# Patient Record
Sex: MALE | Race: WHITE | Employment: FULL TIME | URBAN - METROPOLITAN AREA
[De-identification: names, ages, dates, MRNs, and addresses within clinical notes are randomized per-mention and may not be internally consistent; named-entity substitution may affect disease eponyms.]

---

## 2020-01-02 DIAGNOSIS — N39.0 URINARY TRACT INFECTION: Primary | ICD-10-CM

## 2020-02-06 ENCOUNTER — APPOINTMENT (OUTPATIENT)
Dept: MRI IMAGING | Facility: CLINIC | Age: 55
DRG: 300 | End: 2020-02-06
Attending: NURSE PRACTITIONER
Payer: COMMERCIAL

## 2020-02-06 ENCOUNTER — APPOINTMENT (OUTPATIENT)
Dept: MRI IMAGING | Facility: CLINIC | Age: 55
DRG: 300 | End: 2020-02-06
Attending: INTERNAL MEDICINE
Payer: COMMERCIAL

## 2020-02-06 ENCOUNTER — APPOINTMENT (OUTPATIENT)
Dept: CT IMAGING | Facility: CLINIC | Age: 55
DRG: 300 | End: 2020-02-06
Attending: EMERGENCY MEDICINE
Payer: COMMERCIAL

## 2020-02-06 ENCOUNTER — HOSPITAL ENCOUNTER (INPATIENT)
Facility: CLINIC | Age: 55
LOS: 1 days | Discharge: HOME | DRG: 300 | End: 2020-02-07
Attending: EMERGENCY MEDICINE | Admitting: INTERNAL MEDICINE
Payer: COMMERCIAL

## 2020-02-06 ENCOUNTER — APPOINTMENT (OUTPATIENT)
Dept: CARDIOLOGY | Facility: CLINIC | Age: 55
DRG: 300 | End: 2020-02-06
Attending: INTERNAL MEDICINE
Payer: COMMERCIAL

## 2020-02-06 DIAGNOSIS — R29.898 WEAKNESS OF BOTH UPPER EXTREMITIES: ICD-10-CM

## 2020-02-06 DIAGNOSIS — I71.02 DISSECTION OF ABDOMINAL AORTA (H): ICD-10-CM

## 2020-02-06 DIAGNOSIS — I71.02 DISSECTION OF ABDOMINAL AORTA (H): Primary | ICD-10-CM

## 2020-02-06 PROBLEM — I71.00 AORTIC DISSECTION (H): Status: ACTIVE | Noted: 2020-02-06

## 2020-02-06 PROBLEM — M45.7 ANKYLOSING SPONDYLITIS OF LUMBOSACRAL REGION (H): Status: ACTIVE | Noted: 2020-02-06

## 2020-02-06 LAB
ANION GAP SERPL CALCULATED.3IONS-SCNC: 5 MMOL/L (ref 3–14)
APTT PPP: 31 SEC (ref 22–37)
BASOPHILS # BLD AUTO: 0 10E9/L (ref 0–0.2)
BASOPHILS NFR BLD AUTO: 0.3 %
BUN SERPL-MCNC: 13 MG/DL (ref 7–30)
CALCIUM SERPL-MCNC: 9.4 MG/DL (ref 8.5–10.1)
CHLORIDE SERPL-SCNC: 103 MMOL/L (ref 94–109)
CO2 SERPL-SCNC: 29 MMOL/L (ref 20–32)
CREAT SERPL-MCNC: 0.92 MG/DL (ref 0.66–1.25)
DIFFERENTIAL METHOD BLD: ABNORMAL
EOSINOPHIL # BLD AUTO: 0.1 10E9/L (ref 0–0.7)
EOSINOPHIL NFR BLD AUTO: 0.7 %
ERYTHROCYTE [DISTWIDTH] IN BLOOD BY AUTOMATED COUNT: 12.1 % (ref 10–15)
GFR SERPL CREATININE-BSD FRML MDRD: >90 ML/MIN/{1.73_M2}
GLUCOSE BLDC GLUCOMTR-MCNC: 95 MG/DL (ref 70–99)
GLUCOSE SERPL-MCNC: 141 MG/DL (ref 70–99)
HCT VFR BLD AUTO: 43.9 % (ref 40–53)
HGB BLD-MCNC: 14.9 G/DL (ref 13.3–17.7)
IMM GRANULOCYTES # BLD: 0 10E9/L (ref 0–0.4)
IMM GRANULOCYTES NFR BLD: 0.2 %
INR PPP: 1.07 (ref 0.86–1.14)
LYMPHOCYTES # BLD AUTO: 1.9 10E9/L (ref 0.8–5.3)
LYMPHOCYTES NFR BLD AUTO: 15.6 %
MCH RBC QN AUTO: 31.8 PG (ref 26.5–33)
MCHC RBC AUTO-ENTMCNC: 33.9 G/DL (ref 31.5–36.5)
MCV RBC AUTO: 94 FL (ref 78–100)
MONOCYTES # BLD AUTO: 1 10E9/L (ref 0–1.3)
MONOCYTES NFR BLD AUTO: 8.5 %
NEUTROPHILS # BLD AUTO: 8.9 10E9/L (ref 1.6–8.3)
NEUTROPHILS NFR BLD AUTO: 74.7 %
NRBC # BLD AUTO: 0 10*3/UL
NRBC BLD AUTO-RTO: 0 /100
PLATELET # BLD AUTO: 312 10E9/L (ref 150–450)
POTASSIUM SERPL-SCNC: 3.9 MMOL/L (ref 3.4–5.3)
RBC # BLD AUTO: 4.68 10E12/L (ref 4.4–5.9)
SODIUM SERPL-SCNC: 137 MMOL/L (ref 133–144)
WBC # BLD AUTO: 11.9 10E9/L (ref 4–11)

## 2020-02-06 PROCEDURE — 99207 ZZC APP CREDIT; MD BILLING SHARED VISIT: CPT | Performed by: NURSE PRACTITIONER

## 2020-02-06 PROCEDURE — 99223 1ST HOSP IP/OBS HIGH 75: CPT | Performed by: NURSE PRACTITIONER

## 2020-02-06 PROCEDURE — 70551 MRI BRAIN STEM W/O DYE: CPT | Mod: 52

## 2020-02-06 PROCEDURE — 20000003 ZZH R&B ICU

## 2020-02-06 PROCEDURE — 93005 ELECTROCARDIOGRAM TRACING: CPT

## 2020-02-06 PROCEDURE — 25000125 ZZHC RX 250: Performed by: EMERGENCY MEDICINE

## 2020-02-06 PROCEDURE — 25800030 ZZH RX IP 258 OP 636: Performed by: INTERNAL MEDICINE

## 2020-02-06 PROCEDURE — 99291 CRITICAL CARE FIRST HOUR: CPT

## 2020-02-06 PROCEDURE — 71260 CT THORAX DX C+: CPT

## 2020-02-06 PROCEDURE — 85025 COMPLETE CBC W/AUTO DIFF WBC: CPT | Performed by: EMERGENCY MEDICINE

## 2020-02-06 PROCEDURE — 93306 TTE W/DOPPLER COMPLETE: CPT | Mod: 26 | Performed by: INTERNAL MEDICINE

## 2020-02-06 PROCEDURE — 40000264 ECHOCARDIOGRAM COMPLETE

## 2020-02-06 PROCEDURE — 72156 MRI NECK SPINE W/O & W/DYE: CPT

## 2020-02-06 PROCEDURE — 93010 ELECTROCARDIOGRAM REPORT: CPT | Performed by: INTERNAL MEDICINE

## 2020-02-06 PROCEDURE — 70450 CT HEAD/BRAIN W/O DYE: CPT

## 2020-02-06 PROCEDURE — 80048 BASIC METABOLIC PNL TOTAL CA: CPT | Performed by: EMERGENCY MEDICINE

## 2020-02-06 PROCEDURE — 85610 PROTHROMBIN TIME: CPT | Performed by: EMERGENCY MEDICINE

## 2020-02-06 PROCEDURE — 85730 THROMBOPLASTIN TIME PARTIAL: CPT | Performed by: EMERGENCY MEDICINE

## 2020-02-06 PROCEDURE — 70496 CT ANGIOGRAPHY HEAD: CPT

## 2020-02-06 PROCEDURE — 25000132 ZZH RX MED GY IP 250 OP 250 PS 637: Performed by: INTERNAL MEDICINE

## 2020-02-06 PROCEDURE — 00000146 ZZHCL STATISTIC GLUCOSE BY METER IP

## 2020-02-06 PROCEDURE — 96374 THER/PROPH/DIAG INJ IV PUSH: CPT | Mod: 59

## 2020-02-06 PROCEDURE — 72158 MRI LUMBAR SPINE W/O & W/DYE: CPT

## 2020-02-06 PROCEDURE — 25000128 H RX IP 250 OP 636: Performed by: EMERGENCY MEDICINE

## 2020-02-06 PROCEDURE — 25000128 H RX IP 250 OP 636: Performed by: HOSPITALIST

## 2020-02-06 PROCEDURE — 99292 CRITICAL CARE ADDL 30 MIN: CPT

## 2020-02-06 PROCEDURE — 99223 1ST HOSP IP/OBS HIGH 75: CPT | Mod: AI | Performed by: INTERNAL MEDICINE

## 2020-02-06 PROCEDURE — 72157 MRI CHEST SPINE W/O & W/DYE: CPT

## 2020-02-06 RX ORDER — POTASSIUM CHLORIDE 1.5 G/1.58G
20-40 POWDER, FOR SOLUTION ORAL
Status: DISCONTINUED | OUTPATIENT
Start: 2020-02-06 | End: 2020-02-07 | Stop reason: HOSPADM

## 2020-02-06 RX ORDER — GADOBUTROL 604.72 MG/ML
7 INJECTION INTRAVENOUS ONCE
Status: DISCONTINUED | OUTPATIENT
Start: 2020-02-06 | End: 2020-02-06

## 2020-02-06 RX ORDER — ACETAMINOPHEN 325 MG/1
650 TABLET ORAL EVERY 4 HOURS PRN
Status: DISCONTINUED | OUTPATIENT
Start: 2020-02-06 | End: 2020-02-07 | Stop reason: HOSPADM

## 2020-02-06 RX ORDER — GADOBUTROL 604.72 MG/ML
7 INJECTION INTRAVENOUS ONCE
Status: DISCONTINUED | OUTPATIENT
Start: 2020-02-06 | End: 2020-02-06 | Stop reason: CLARIF

## 2020-02-06 RX ORDER — HYDROMORPHONE HYDROCHLORIDE 1 MG/ML
.3-.5 INJECTION, SOLUTION INTRAMUSCULAR; INTRAVENOUS; SUBCUTANEOUS
Status: DISCONTINUED | OUTPATIENT
Start: 2020-02-06 | End: 2020-02-07 | Stop reason: HOSPADM

## 2020-02-06 RX ORDER — MAGNESIUM SULFATE HEPTAHYDRATE 40 MG/ML
4 INJECTION, SOLUTION INTRAVENOUS EVERY 4 HOURS PRN
Status: DISCONTINUED | OUTPATIENT
Start: 2020-02-06 | End: 2020-02-07 | Stop reason: HOSPADM

## 2020-02-06 RX ORDER — IOPAMIDOL 755 MG/ML
70 INJECTION, SOLUTION INTRAVASCULAR ONCE
Status: COMPLETED | OUTPATIENT
Start: 2020-02-06 | End: 2020-02-06

## 2020-02-06 RX ORDER — POTASSIUM CHLORIDE 1500 MG/1
20-40 TABLET, EXTENDED RELEASE ORAL
Status: DISCONTINUED | OUTPATIENT
Start: 2020-02-06 | End: 2020-02-07 | Stop reason: HOSPADM

## 2020-02-06 RX ORDER — POTASSIUM CHLORIDE 7.45 MG/ML
10 INJECTION INTRAVENOUS
Status: DISCONTINUED | OUTPATIENT
Start: 2020-02-06 | End: 2020-02-07 | Stop reason: HOSPADM

## 2020-02-06 RX ORDER — AMOXICILLIN 250 MG
1 CAPSULE ORAL 2 TIMES DAILY PRN
Status: DISCONTINUED | OUTPATIENT
Start: 2020-02-06 | End: 2020-02-07 | Stop reason: HOSPADM

## 2020-02-06 RX ORDER — DIAZEPAM 10 MG/2ML
2.5 INJECTION, SOLUTION INTRAMUSCULAR; INTRAVENOUS ONCE
Status: COMPLETED | OUTPATIENT
Start: 2020-02-06 | End: 2020-02-06

## 2020-02-06 RX ORDER — MELOXICAM 7.5 MG/1
7.5 TABLET ORAL DAILY
Status: ON HOLD | COMMUNITY
End: 2020-02-07

## 2020-02-06 RX ORDER — IOPAMIDOL 755 MG/ML
60 INJECTION, SOLUTION INTRAVASCULAR ONCE
Status: DISCONTINUED | OUTPATIENT
Start: 2020-02-06 | End: 2020-02-06

## 2020-02-06 RX ORDER — ONDANSETRON 4 MG/1
4 TABLET, ORALLY DISINTEGRATING ORAL EVERY 6 HOURS PRN
Status: DISCONTINUED | OUTPATIENT
Start: 2020-02-06 | End: 2020-02-07 | Stop reason: HOSPADM

## 2020-02-06 RX ORDER — POTASSIUM CL/LIDO/0.9 % NACL 10MEQ/0.1L
10 INTRAVENOUS SOLUTION, PIGGYBACK (ML) INTRAVENOUS
Status: DISCONTINUED | OUTPATIENT
Start: 2020-02-06 | End: 2020-02-07 | Stop reason: HOSPADM

## 2020-02-06 RX ORDER — NALOXONE HYDROCHLORIDE 0.4 MG/ML
.1-.4 INJECTION, SOLUTION INTRAMUSCULAR; INTRAVENOUS; SUBCUTANEOUS
Status: DISCONTINUED | OUTPATIENT
Start: 2020-02-06 | End: 2020-02-07 | Stop reason: HOSPADM

## 2020-02-06 RX ORDER — IOPAMIDOL 755 MG/ML
120 INJECTION, SOLUTION INTRAVASCULAR ONCE
Status: DISCONTINUED | OUTPATIENT
Start: 2020-02-06 | End: 2020-02-06

## 2020-02-06 RX ORDER — ONDANSETRON 2 MG/ML
4 INJECTION INTRAMUSCULAR; INTRAVENOUS EVERY 6 HOURS PRN
Status: DISCONTINUED | OUTPATIENT
Start: 2020-02-06 | End: 2020-02-07 | Stop reason: HOSPADM

## 2020-02-06 RX ORDER — HYDRALAZINE HYDROCHLORIDE 20 MG/ML
10 INJECTION INTRAMUSCULAR; INTRAVENOUS EVERY 4 HOURS PRN
Status: DISCONTINUED | OUTPATIENT
Start: 2020-02-06 | End: 2020-02-07 | Stop reason: HOSPADM

## 2020-02-06 RX ORDER — AMOXICILLIN 250 MG
2 CAPSULE ORAL 2 TIMES DAILY PRN
Status: DISCONTINUED | OUTPATIENT
Start: 2020-02-06 | End: 2020-02-07 | Stop reason: HOSPADM

## 2020-02-06 RX ORDER — POTASSIUM CHLORIDE 29.8 MG/ML
20 INJECTION INTRAVENOUS
Status: DISCONTINUED | OUTPATIENT
Start: 2020-02-06 | End: 2020-02-07 | Stop reason: HOSPADM

## 2020-02-06 RX ORDER — SODIUM CHLORIDE 9 MG/ML
INJECTION, SOLUTION INTRAVENOUS CONTINUOUS
Status: DISCONTINUED | OUTPATIENT
Start: 2020-02-06 | End: 2020-02-07 | Stop reason: HOSPADM

## 2020-02-06 RX ADMIN — LABETALOL HYDROCHLORIDE 1 MG/MIN: 5 INJECTION INTRAVENOUS at 10:19

## 2020-02-06 RX ADMIN — SODIUM CHLORIDE: 9 INJECTION, SOLUTION INTRAVENOUS at 13:18

## 2020-02-06 RX ADMIN — SODIUM CHLORIDE 100 ML: 9 INJECTION, SOLUTION INTRAVENOUS at 09:26

## 2020-02-06 RX ADMIN — DIAZEPAM 2.5 MG: 5 INJECTION, SOLUTION INTRAMUSCULAR; INTRAVENOUS at 22:54

## 2020-02-06 RX ADMIN — IOPAMIDOL 70 ML: 755 INJECTION, SOLUTION INTRAVENOUS at 09:26

## 2020-02-06 RX ADMIN — SODIUM CHLORIDE 100 ML: 9 INJECTION, SOLUTION INTRAVENOUS at 09:38

## 2020-02-06 RX ADMIN — ACETAMINOPHEN 650 MG: 325 TABLET, FILM COATED ORAL at 17:01

## 2020-02-06 RX ADMIN — IOPAMIDOL 70 ML: 755 INJECTION, SOLUTION INTRAVENOUS at 09:38

## 2020-02-06 ASSESSMENT — MIFFLIN-ST. JEOR: SCORE: 1684.75

## 2020-02-06 ASSESSMENT — ACTIVITIES OF DAILY LIVING (ADL)
SWALLOWING: 0-->SWALLOWS FOODS/LIQUIDS WITHOUT DIFFICULTY
AMBULATION: 0-->INDEPENDENT
BATHING: 0-->INDEPENDENT
TRANSFERRING: 0-->INDEPENDENT
TOILETING: 0-->INDEPENDENT
ADLS_ACUITY_SCORE: 11
RETIRED_COMMUNICATION: 0-->UNDERSTANDS/COMMUNICATES WITHOUT DIFFICULTY
COGNITION: 0 - NO COGNITION ISSUES REPORTED
FALL_HISTORY_WITHIN_LAST_SIX_MONTHS: NO
RETIRED_EATING: 0-->INDEPENDENT
DRESS: 0-->INDEPENDENT
ADLS_ACUITY_SCORE: 11

## 2020-02-06 ASSESSMENT — ENCOUNTER SYMPTOMS
NUMBNESS: 1
DIZZINESS: 1
BACK PAIN: 1

## 2020-02-06 NOTE — H&P
[facility]    History and Physical  Hospitalist       Date of Admission:  2/6/2020    Assessment & Plan   Nadeem Shine is a 54 year old male history of ankylosing spondylitis presents to the emergency department with acute onset of bilateral upper extremity weakness and numbness.  Active Problems:    Dissection of abdominal aorta (H)-infrarenal area  --Evaluation for his upper extremity weakness  with the CTA did indicate infrarenal aortic dissection, prior to the bifurcation of the both iliac arteries, currently started on  labetalol drip for blood pressure control, goal is systolic blood pressure is 120, patient did not have any prior history of hypertension, and the ER physician did have a concern whether patient has Marfan syndrome, he is 6.2 feet tall.  Echocardiogram ordered.  --Admit to ICU, continue neurochecks every 1 hour, continuous blood pressure monitoring.    Weakness of both upper extremities  --Patient presented with the bilateral upper extremity weakness, which has progressively improved since today morning, his weakness is 4/5 now and the numbness is improved, this could be either a TIA versus a spinal process, will need MRI of his cervical thoracic and lumbar spine.   MRI is ordered.  --Neurology had seen the patient in the ER, stroke neurology is involved, MRI of his brain and CTA of his head and neck did not indicate any signs of stroke.      Ankylosing spondylitis of lumbosacral region (H) history of  --Currently is only on PRN NSAID, he is HLA-B27 positive  DVT Prophylaxis: Pneumatic Compression Devices  Code Status: Full Code    Disposition: Expected discharge in 2 to 3 days    Eufemia Beyer MD    Primary Care Physician   Physician No Ref-Primary    Chief Complaint   Bilateral upper extremity numbness and weakness 1 day    History of Present Illness   Nadeem Shine is a 54 year old male history of ankylosing spondylitis presents with a bilateral upper extremity weakness.   Patient woke up at 6 AM and started noticing back pain mostly in the upper back and had numbness and tingling in his bilateral upper extremities, currently his symptoms are much improved, he had trouble raising his left arm above his head and he could not even grab a glass of water.  He took NSAID for pain control with no improvement he decided to report to the emergency department, he had also noticed unsteadiness, needed support to ambulate.    In the emergency department he was evaluated for a CVA, MRI of the brain, CTA head and neck was obtained and and CT angiogram was also done for aorta  which showed infrarenal aortic dissection.  Patient did not have any chest pain, shortness of breath, nausea vomiting or abdominal pain prior to this, denies any dysuria or increased frequency of micturition.  didNot have any fever or chills.    Past Medical History    I have reviewed this patient's medical history and updated it with pertinent information if needed.   Past Medical History:   Diagnosis Date     Arthritis        Past Surgical History   I reviewed his past surgical history, he does not have any history of prior surgeries.  Prior to Admission Medications   Prior to Admission Medications   Prescriptions Last Dose Informant Patient Reported? Taking?   UNABLE TO FIND 2/6/2020 at Unknown time Self Yes Yes   Sig: Take by mouth once MEDICATION NAME: paracetamol (similar to acetaminophen, used in other countries)   UNKNOWN TO PATIENT 2/6/2020 at Unknown time Self Yes Yes   Sig: Apply topically once Topical analgesic, unknown name   meloxicam (MOBIC) 7.5 MG tablet 2/6/2020 at am Self Yes Yes   Sig: Take 7.5 mg by mouth daily      Facility-Administered Medications: None     Allergies   No Known Allergies    Social History   I have reviewed this patient's social history and updated it with pertinent information if needed. Nadeem Shine  reports that he has never smoked. He does not have any smokeless tobacco history on  file. He reports current alcohol use. He reports that he does not use drugs.    Family History   I have reviewed this patient's family history and updated it with pertinent information if needed.   Mother has ankylosing spondylitis and HLA-B27   Review of Systems   The 10 point Review of Systems is negative other than noted in the HPI or here.    Physical Exam   Temp: 97.7  F (36.5  C) Temp src: Oral BP: 122/80 Pulse: 60 Heart Rate: 59 Resp: 11 SpO2: 98 % O2 Device: None (Room air)    Vital Signs with Ranges  Temp:  [97.7  F (36.5  C)] 97.7  F (36.5  C)  Pulse:  [55-80] 60  Heart Rate:  [59-66] 59  Resp:  [8-20] 11  BP: (114-155)/(78-94) 122/80  SpO2:  [98 %] 98 %  169 lbs 12.07 oz    Constitutional: Awake, alert, cooperative, no apparent distress.  Eyes: Conjunctiva and pupils examined and normal.  HEENT: Moist mucous membranes, normal dentition.  Respiratory: Clear to auscultation bilaterally, no crackles or wheezing.  Cardiovascular: Regular rate and rhythm, normal S1 and S2, and no murmur noted.  GI: Soft, non-distended, non-tender, normal bowel sounds.  Lymph/Hematologic: No anterior cervical or supraclavicular adenopathy.  Skin: No rashes, no cyanosis, no edema.  Musculoskeletal: No joint swelling, erythema or tenderness.  Neurologic: Cranial nerves 2-12 intact, bilateral upper extremity power seems to be 4 out of 5, similar on the lower extremities.  Psychiatric: Alert, oriented to person, place and time, no obvious anxiety or depression.    Data   Data reviewed today: ekg ordered  Recent Labs   Lab 02/06/20  0900   WBC 11.9*   HGB 14.9   MCV 94      INR 1.07      POTASSIUM 3.9   CHLORIDE 103   CO2 29   BUN 13   CR 0.92   ANIONGAP 5   JULIO 9.4   *       Imaging:  Recent Results (from the past 24 hour(s))   CTA Head Neck with Contrast    Narrative    CTA  HEAD AND NECK WITH CONTRAST 2/6/2020 9:30 AM     HISTORY: Transient ischemic attack, initial exam. Code Stroke.  Bilateral arm  weakness.    TECHNIQUE: Axial images were obtained through the head and neck with  intravenous contrast. 70 mL of Isovue-370 given. Radiation dose for  this scan was reduced using automated exposure control, adjustment of  the mA and/or kV according to patient size, or iterative  reconstruction technique. Multiplanar reconstructions were performed.  3-D reconstructions off a remote workstation for CT angiography were  also acquired. Carotid stenoses were evaluated by comparing the  caliber of the proximal internal carotid artery to the caliber of the  distal internal carotid artery.    FINDINGS:    Brachiocephalic vessels: Normal.    Right carotid system: Normal.    Left carotid system: Normal.    Right vertebral artery: Normal.    Left vertebral artery: Normal.    [place]: Normal.    Other findings: None.      Impression    IMPRESSION: Negative CT angiography of the head and neck.    DOM SOFIA MD   CT Head w/o Contrast    Narrative    CT SCAN OF THE HEAD WITHOUT CONTRAST   2/6/2020 9:39 AM     HISTORY: Transient ischemic attack, initial exam. Code Stroke.  Bilateral arm weakness.    TECHNIQUE:  Axial images of the head and coronal reformations without  IV contrast material.  Radiation dose for this scan was reduced using  automated exposure control, adjustment of the mA and/or kV according  to patient size, or iterative reconstruction technique.    COMPARISON: None.    FINDINGS:  The ventricles are normal in size, shape and configuration.   The brain parenchyma and subarachnoid spaces are normal. There is no  evidence of intracranial hemorrhage, mass, acute infarct or anomaly.     The visualized portions of the sinuses and mastoids appear normal.  There is no evidence of trauma.      Impression    IMPRESSION:  Normal CT scan of the head.     DOM SOFIA MD   CT Aortic Survey w Contrast    Narrative    CT AORTIC SURVEY WITH CONTRAST   2/6/2020 9:56 AM     HISTORY: Bilateral arm weakness. Bilateral flank  pain.    TECHNIQUE: 70 mL Isovue-370 IV were administered. Aortic survey  protocol was performed. After contrast administration, volumetric  helical sections were acquired from the thoracic inlet to the ischial  tuberosities. Coronal images were also reconstructed. Radiation dose  for this scan was reduced using automated exposure control, adjustment  of the mA and/or kV according to patient size, or iterative  reconstruction technique.    COMPARISON: None.    FINDINGS:    Chest: No evidence for thoracic aortic aneurysm or dissection. No  pleural or pericardial effusions. No enlarged lymph nodes are  identified in the chest. Mild scarring and/or atelectasis at both lung  bases posteriorly. The lungs are otherwise clear. Small hiatal hernia.    Abdomen and Pelvis: A dissection flap is noted in the infrarenal  abdominal aorta. This flap does not appear to extend into the common  iliac arteries or CHI. No evidence for abdominal aortic aneurysm or  aortic rupture. The liver, gallbladder, spleen, adrenal glands,  pancreas, and kidneys are unremarkable. No hydronephrosis. No bowel  obstruction. Unremarkable appendix. No free fluid in the pelvis. No  convincing evidence for colitis or diverticulitis. Few scattered  sigmoid diverticula are noted. No intra-abdominal fluid collections.  No free intraperitoneal air. Degenerative changes are noted in the  thoracolumbar spine. Thoracic kyphosis.      Impression    IMPRESSION:   1. There is a dissection flap within the infrarenal abdominal aorta.  No evidence for associated aneurysm or rupture.  2. Small hiatal hernia.  3. Few scattered sigmoid diverticula are noted, without convincing  evidence for diverticulitis.       MR Brain w/o Contrast    Narrative    MRI BRAIN WITHOUT CONTRAST  2/6/2020 10:00 AM    HISTORY:  Hyperacute stroke protocol, bilateral arm weakness.    TECHNIQUE:  Multiplanar, multisequence MRI of the brain without  gadolinium IV contrast  material.    COMPARISON:  None.    FINDINGS:  The brain parenchyma, ventricles and subarachnoid spaces  appear normal. There is no evidence of hemorrhage, mass, acute  infarct, or anomaly.     The facial structures appear normal. The arteries at the base of the  brain and the dural venous sinuses appear patent.       Impression    IMPRESSION:  Normal MRI of the brain.    DOM SOFIA MD

## 2020-02-06 NOTE — ED PROVIDER NOTES
"  History     Chief Complaint:  Generalized Weakness      HPI   Nadeem Shine is a 54 year old male who presents with generalized weakness. This morning when the patient woke up at 0600 he noted \"stabbing\" back pain generalized throughout his back but located primarily around his left shoulder blade. The back pain continued and at 0630 as he was walking to the shower he realized that he was unable to raise his arms upwards to grab a towel. He took a painkiller and applied pain relief cream to his arms with no relied. The patient also states that he feels \"unsteady\" when walking. He denies numbness and tingling to his extremities.     Allergies:  No Known Drug Allergies    Medications:    Medications reviewed. No current medications.     Past Medical History:    Medical history reviewed. No pertinent medical history.    Past Surgical History:    Surgical history reviewed. No pertinent surgical history.    Family History:    Family history reviewed. No pertinent family history.     Social History:  The patient was accompanied to the ED by wife.  Smoking Status: Never Smoker  Smokeless Tobacco: Never Used  Alcohol Use: Yes  Drug Use: No  Marital Status:       Review of Systems   Musculoskeletal: Positive for back pain.   Neurological: Positive for dizziness and numbness.    10 point review of systems performed and is negative except as above and in HPI.      Physical Exam     Patient Vitals for the past 24 hrs:   BP Temp Temp src Pulse Heart Rate Resp SpO2 Height Weight   02/06/20 1050 (!) 124/93 -- -- 59 66 14 -- -- --   02/06/20 1040 (!) 128/91 -- -- 61 59 8 -- -- --   02/06/20 1015 121/85 -- -- 60 64 8 -- -- --   02/06/20 1005 135/83 -- -- 61 61 9 -- -- --   02/06/20 1002 139/86 -- -- 64 -- 16 -- -- --   02/06/20 0940 (!) 154/94 -- -- 71 -- 12 -- -- --   02/06/20 0925 134/82 -- -- 55 -- 16 -- -- --   02/06/20 0910 (!) 155/78 -- -- 63 -- 10 -- -- --   02/06/20 0847 (!) 151/82 97.7  F (36.5  C) Oral 80 -- 20 98 " "% 1.88 m (6' 2\") 77 kg (169 lb 12.1 oz)       Physical Exam     General: Resting on the gurney, appears uncomfortable  Head:  The scalp, face, and head appear normal  Mouth/Throat: Mucus membranes are moist  CV:  Regular rate    Normal S1 and S2  No pathological murmur   Resp:  Breath sounds clear and equal bilaterally    Non-labored, no retractions or accessory muscle use    No coarseness    No wheezing   GI:  Abdomen is soft, no rigidity    No tenderness to palpation  MS:  See neuro  Skin:   No rash or lesions noted.  Neuro:  Bilateral upper extremities most note able in the deltoid. Patient is able to move the hand and forearm but not able to make a fist and is unable to raise the arms above his head. Gait is somewhat ataxic in the lower extremities are minimally weak bilaterally. Speech is normal and fluent.  Cranial nerves II-XII intact.   Psych:  Awake. Alert.  Normal affect.      Appropriate interactions.      Emergency Department Course     Imaging:  Radiology findings were communicated with the patient who voiced understanding of the findings.    MR Brain w/o Contrast  Normal MRI of the brain.  Reading per radiology    CT Aortic Survey w Contrast   1. There is a dissection flap within the infrarenal abdominal aorta.  No evidence for associated aneurysm or rupture.  2. Small hiatal hernia.  3. Few scattered sigmoid diverticula are noted, without convincing  evidence for diverticulitis.  Reading per radiology    CT Head w/o Contrast   Normal CT scan of the head.   Reading per radiology    CTA Head Neck with Contrast  Negative CT angiography of the head and neck.  Reading per radiology    Laboratory:  Laboratory findings were communicated with the patient who voiced understanding of the findings.    CBC: WBC 11.9 (H), HGB 14.9,   BMP: glucose 141 (H) o/w WNL (Creatinine 0.92)  INR: 1.07  PTT: 31    Interventions:  1019 Labetalol 1 mg/min IV    Emergency Department Course:  Nursing notes and vitals " reviewed.    0852 I performed an exam of the patient as documented above.     0856 Code stroke called.     IV was inserted and blood was drawn for laboratory testing, results above.    The patient was sent for a MR brain, CT aortic survey, CT head, and CTA head neck while in the emergency department, results above.     0901  I spoke with Dr. Giron, of the stroke neurology service at [facility], regarding the patient.    0936 I spoke with Dr. Blanton, of the radiology service.     0948  I spoke with Dr. Monae, of the vascular surgery service at [facility], regarding the patient.     1001  I spoke with Dr. Giron, of the stroke neurology service at [facility], regarding the patient.    1005 I returned to update the patient.     1039 I spoke with Dr. Ware of the Emergency Medicine service from [facility] regarding patient's presentation, findings, and plan of care.     1045 I spoke with Dr. Wright, of the vascular surgery service at [facility], regarding the patient.    1057 I spoke with Dr. Beyer of the Hospitalist service from [facility] regarding patient's presentation, findings, and plan of care.    1049 I returned to update the patient about their plan for admit.     Findings and plan explained to the Patient who consents to admission. Discussed the patient with Dr. Beyer, who will admit the patient to a ICU bed for further monitoring, evaluation, and treatment.    Impression & Plan      Medical Decision Making:  Nadeem Shine is a 54 year old male who presents to the emergency department today for evaluation of severe back pain as well as bilateral arm weakness and gait ataxia.  His symptoms were concerning for aortic dissection and therefore an aortic study was obtained.  I discussed case with neurology who wanted a CT stroke protocol of the brain as well.  The CT of the brain was unremarkable.  The patient does have an infrarenal aortic dissection,  however, this does not explain his neurologic symptoms.  We initiated tight blood pressure control using a labetalol drip.  Patient symptoms did improve while in the emergency department.  It is unclear what the neurologic origin for symptoms is.  He will have further imaging as an inpatient and will be admitted to the ICU on labetalol drip for aortic dissection.    Critical Care time was 45 minutes for this patient excluding procedures.     Diagnosis:    ICD-10-CM    1. Dissection of abdominal aorta (H) I71.02        Disposition:   The patient is admitted into the care of Dr. Beyer.    Scribe Disclosure:  Marla GILLESPIE, am serving as a scribe at 8:56 AM on 2/6/2020 to document services personally performed by Allyssa Zuniga MD based on my observations and the provider's statements to me.      EMERGENCY DEPARTMENT       Allyssa Zuniga MD  02/06/20 [id number]

## 2020-02-06 NOTE — PHARMACY-ADMISSION MEDICATION HISTORY
Pharmacy Medication History  Admission medication history interview status for the 2/6/2020  admission is complete. See EPIC admission navigator for prior to admission medications     Medication history sources: Patient  Medication history source reliability: Good  Adherence assessment: Good    Significant changes made to the medication list:  Added meds to list, pt doesn't use many      Additional medication history information:   none    Medication reconciliation completed by provider prior to medication history? No    Time spent in this activity: 5 minutes      Prior to Admission medications    Medication Sig Last Dose Taking? Auth Provider   meloxicam (MOBIC) 7.5 MG tablet Take 7.5 mg by mouth daily 2/6/2020 at am Yes Unknown, Entered By History   UNABLE TO FIND Take by mouth once MEDICATION NAME: paracetamol (similar to acetaminophen, used in other countries) 2/6/2020 at Unknown time Yes Unknown, Entered By History   UNKNOWN TO PATIENT Apply topically once Topical analgesic, unknown name 2/6/2020 at Unknown time Yes Unknown, Entered By History

## 2020-02-06 NOTE — ED NOTES
Patient states woke up with pain in left side of his back in shoulder that travels across his back. States was unable to move upper extremities. Also complains of feeling unsteady on his feet. Patient reports tingling in upper extremities. Patient cannot extend arms at his elbows and cannot extend fingers. Slight contraction in both upper extremities.

## 2020-02-06 NOTE — ED NOTES
Upon returning to ED room following CT and MRI pt was noticed to be flexing and extending fingers of right hand, when asked how he was doing pt stated that his feeling was coming back to hand and able to move his fingers more. Pt also raised right arm above head and was flexing and extending at the elbow which previously pt was unable to do.

## 2020-02-06 NOTE — CONSULTS
Vascular Staff    Paged about possible transfer to [facility] for aortic dissection. Marfan's patient with focal infrarenal aortic dissection who actually presented with bilateral arm weakness then had flank pain. Aortic images reviewed which show a focal infrarenal aortic tear beginning about 2-3cm below the level of the renal arteries with extent to the aortic bifurcation with preserved flow in CHI and bilateral iliac arteries.     Neurology has been consulted regarding arm weakness and whether there was any focal spine infarct more proximal as the brain MRI was normal.    Would recommend blood pressure control to keep SBP<120 and HR in the 60s. Please begin oral beta blocker today and transition off drips as able. He does not appear to have any current vascular surgery needs, but does need a follow up CTA of his aorta in one month. Will connect with our Vascular Surgery staff at [facility], Dr Starkey, for follow up.    Thank you!  Manuela Wright MD, DFSVS, RPVI  Director, [facility]  Chief, Vascular and Endovascular Surgery  [facility]  [email or web address]

## 2020-02-06 NOTE — PROVIDER NOTIFICATION
During MRI scan, pt c/o severe pain in his back and hip that he felt was due to necessary positioning for scan. Pt had PRN dilaudid ordered, but it was felt by MRI tech and this writer that pt would need conscious sedation. About 1.5 hrs remains in the scan. Pt brought back to ICU and Dr. Beyer called. Dr. Beyer instructed to call anesthesia to have them perform conscious sedation. Per CRNA, RN's are able to administer valium, and they would be able to assist if pt needed to be intubated. Dr. Beyer paged again to inquire about sedation orders. CRNA also checking normal process and will return call to ICU.   Gia Martinez RN at 5:26 PM    Per CRNA, a MRI order with a consult for sedation requirement needs to be ordered by a physician. Awaiting return call from Dr. Beyer about PRN meds for MRI completion and to inform of additional order needed if pt requires it.   Gia Martinez RN on 2/6/2020 at 5:55 PM      Paged hospitalist on-call re: the above. Discussed with Dr. Mosley and received orders for 2.5mg IV valium. If unable to complete MRI with valium and dilaudid, MD said conscious sedation or intubation could be discussed tomorrow. Also discussed urinary retention and bladder scan of 700ml. Received orders for straight catheretization. Gia Martinez RN on 2/6/2020 at 6:48 PM    MRI contacted at 1930 to check availability of scanners. Both busy. MRI said they will call unit when there is an opening in schedule. .Gia Martinez RN on 2/6/2020 at 8:59 PM

## 2020-02-06 NOTE — ED TRIAGE NOTES
Pt awoke at 0630 with left back pain shoulder pain - andi loss of control of arm wrist fingers with numbness tingling in elbows - some numbness left lower lip

## 2020-02-06 NOTE — PROGRESS NOTES
Pt arrived to ICU bed 357 at 1130. VSS and neurological assessment unchanged from previous in ED. Labetalol infusing at 1mg/min. Dr. Beyer paged about arrival for admission orders. Gia Martinez RN on 2/6/2020 at 12:14 PM

## 2020-02-06 NOTE — CONSULTS
VASCULAR SURGERY HOSPITAL PATIENT CONSULTATION NOTE  Consulted by:Eufemia Beyer MD  Reason for consultation: Infrarenal aortic dissection    HPI:  Nadeem Shine is a 54 year old year old male who has a PMH significant for ankylosing spondylitis who presented to the hospital for acute onset of bilateral upper extremity weakness.  Patient reports he woke up feeling normal.  When he was getting ready to get into the shower he had sudden weakness in both arms and was unable to lift them above his head.  He also experienced sudden leg weakness.  Patient had a CTA in the ER and was found to have an infrarenal aortic dissection prior to the bifurcation of both iliac arteries.  Patient reports that prior to this event, he has been healthy.  He runs between 5-6 miles a day.  He is a non-smoker.  He lives in [facility] and is on a long-term work assignment in [place] until mid-April.  He denies any significant family history for aneurysms or cardiovascular disease.  His symptoms began to resolve in the ER and he denies any cramping or rest pain in bilateral extremities.  He denies chest pain, abdominal pain, and nausea.    Review Of Systems:   General: Denies F/C  Respiratory: Denies SOB  Cardio: Denies CP  Gastrointestinal: Denies N/V  Genitourinary: Denies recent change in urination  Musculoskeletal: See HPI  Neurologic: Denies HA  Psychiatric: Denies confusion  Hematology/immunology: no unexpected bruising    All other systems negative    PAST MEDICAL HISTORY:  Past Medical History:   Diagnosis Date     Arthritis        PAST SURGICAL HISTORY:  History reviewed. No pertinent surgical history.    FAMILY HISTORY:  History reviewed. No pertinent family history.    SOCIAL HISTORY:   Social History     Tobacco Use     Smoking status: Never Smoker   Substance Use Topics     Alcohol use: Yes       TOBACCO USE:  Never smoker    HOME MEDICATIONS:  Prior to Admission medications    Medication Sig Start Date End Date Taking? Authorizing  "Provider   meloxicam (MOBIC) 7.5 MG tablet Take 7.5 mg by mouth daily   Yes Unknown, Entered By History   UNABLE TO FIND Take by mouth once MEDICATION NAME: paracetamol (similar to acetaminophen, used in other countries)   Yes Unknown, Entered By History   UNKNOWN TO PATIENT Apply topically once Topical analgesic, unknown name   Yes Unknown, Entered By History       VITAL SIGNS:  /65   Pulse 58   Temp 98.4  F (36.9  C) (Oral)   Resp 8   Ht 1.88 m (6' 2\")   Wt 77 kg (169 lb 12.1 oz)   SpO2 99%   BMI 21.80 kg/m      Intake/Output Summary (Last 24 hours) at 2/6/2020 1528  Last data filed at 2/6/2020 1400  Gross per 24 hour   Intake 52.5 ml   Output --   Net 52.5 ml       Labs:  ROUTINE IP LABS (Last four results)  BMP  Recent Labs   Lab 02/06/20  0900      POTASSIUM 3.9   CHLORIDE 103   JULIO 9.4   CO2 29   BUN 13   CR 0.92   *     CBC  Recent Labs   Lab 02/06/20  0900   WBC 11.9*   RBC 4.68   HGB 14.9   HCT 43.9   MCV 94   MCH 31.8   MCHC 33.9   RDW 12.1        INR  Recent Labs   Lab 02/06/20  0900   INR 1.07       PHYSICAL EXAM:  Constitutional: healthy, alert, no acute distress and cooperative   Cardiovascular: RRR  Respiratory: CTAB anteriorly, breathing unlabored without secondary muscle use  Psychiatric: mentation appears normal and affect normal/bright  Neck: no asymmetry  GI/Abdomen: +BS, abdomen soft, non-tender. No masses, no CVAT  MSK: able to move all extremities without weakness or ataxia  Extremities: no open lesions, extremities warm and well perfused.  Palpable bilateral DP/PT pulses.  Hematology: no bruising on visible skin    IMAGING:  CT AORTIC SURVEY WITH CONTRAST   2/6/2020 9:56 AM      HISTORY: Bilateral arm weakness. Bilateral flank pain.     TECHNIQUE: 70 mL Isovue-370 IV were administered. Aortic survey  protocol was performed. After contrast administration, volumetric  helical sections were acquired from the thoracic inlet to the ischial  tuberosities. Coronal " images were also reconstructed. Radiation dose  for this scan was reduced using automated exposure control, adjustment  of the mA and/or kV according to patient size, or iterative  reconstruction technique.     COMPARISON: None.     FINDINGS:    Chest: No evidence for thoracic aortic aneurysm or dissection. No  pleural or pericardial effusions. No enlarged lymph nodes are  identified in the chest. Mild scarring and/or atelectasis at both lung  bases posteriorly. The lungs are otherwise clear. Small hiatal hernia.     Abdomen and Pelvis: A dissection flap is noted in the infrarenal  abdominal aorta. This flap does not appear to extend into the common  iliac arteries or CHI. No evidence for abdominal aortic aneurysm or  aortic rupture. The liver, gallbladder, spleen, adrenal glands,  pancreas, and kidneys are unremarkable. No hydronephrosis. No bowel  obstruction. Unremarkable appendix. No free fluid in the pelvis. No  convincing evidence for colitis or diverticulitis. Few scattered  sigmoid diverticula are noted. No intra-abdominal fluid collections.  No free intraperitoneal air. Degenerative changes are noted in the  thoracolumbar spine. Thoracic kyphosis.                                                                      IMPRESSION:   1. There is a dissection flap noted within the infrarenal abdominal  aorta. No evidence for associated aneurysm or rupture.  2. Small hiatal hernia.  3. Few scattered sigmoid diverticula are noted, without convincing  evidence for diverticulitis.                 IVAN RICH MD    Patient Active Problem List   Diagnosis     Weakness of both upper extremities     Ankylosing spondylitis of lumbosacral region (H) history of     Dissection of abdominal aorta (H)-infrarenal area     Aortic dissection (H)       ASSESSMENT:  54 year old male with PMH of Ankylosing spondylitis who was found to have infrarenal aortic dissection on CTA.      PLAN:  -No acute surgical intervention  indicated at this time  -Continue labetalol gtt and transition to oral beta blocker with SBP goal <120 and HR goal <60.  -Continue neurovascular checks  -Will plan to do outpatient follow up with Dr. Starkey and repeat CTA.  -Patient travels to [place] periodically and is agreeable to long-term follow up in [facility].  -Patient discussed with Dr. Starkey.  Vascular will follow peripherally.      Arianna Whipple, MARLON, APRN, AGNP-C  Division of Vascular Surgery   [facility]   Pager: [phone]

## 2020-02-06 NOTE — CONSULTS
"  [facility]    Stroke Consult Note    Reason for Consult: Stroke code    Chief Complaint: Extremity Weakness      HPI  Nadeem Shine is a 54 year old male with past medical history significant for ankylosing spondylitis who presented to the ED for evaluation of bilateral upper extremity weakness. When he awoke this morning he experienced stabbing back pain that localized to his left scapula. After showering he reached for his towel and realized that he could not lift either arm up above his head. He denies associated numbness, lower extremity weakness, facial droop, speech difficulty or headache.     Initial CTH was negative for acute pathology. CTA head/neck was also unremarkable. A CT aorta revealed a dissection flap within the infrarenal abdominal aorta. Hyperacute MRI was negative for stroke.     TPA Treatment   Not given due to outside the time window.    Endovascular Treatment  Not initiated due to absence of proximal vessel occlusion    Impression  1. Bilateral upper extremity weakness, not explained by #2. MRI brain negative for stroke.   2. Infrarenal aortic dissection    Recommendations  - MRI C/T/L w/wo   - Goal SBP <120 per vascular  - Further recommendations pending results of spinal imaging    Patient Follow-up    - final recommendation pending work-up    Thank you for this consult. We will continue to follow.     POOJA Oglesby, CNP  Neurology  02/06/2020 2:10 PM  To page stroke neurology after hours or on a subsequent day, click here: AMCOM  Choose \"On Call\" tab at top, then search dropdown box for \"Neurology Adult\" & press Enter, look for Neuro ICU/Stroke  ______________________________________________________    Past Medical History   Past Medical History:   Diagnosis Date     Arthritis      Past Surgical History   History reviewed. No pertinent surgical history.  Medications   Home Meds  Prior to Admission medications    Medication Sig Start Date End Date Taking? " Authorizing Provider   meloxicam (MOBIC) 7.5 MG tablet Take 7.5 mg by mouth daily   Yes Unknown, Entered By History   UNABLE TO FIND Take by mouth once MEDICATION NAME: paracetamol (similar to acetaminophen, used in other countries)   Yes Unknown, Entered By History   UNKNOWN TO PATIENT Apply topically once Topical analgesic, unknown name   Yes Unknown, Entered By History       Scheduled Meds      Infusion Meds    labetalol 1000 mg/200 mL 1 mg/min (02/06/20 1138)     labetalol 1000 mg/200 mL       sodium chloride 75 mL/hr at 02/06/20 1318       PRN Meds      Allergies   No Known Allergies  Family History   History reviewed. No pertinent family history.  Social History   Social History     Tobacco Use     Smoking status: Never Smoker   Substance Use Topics     Alcohol use: Yes     Drug use: Never       Review of Systems   The 10 point Review of Systems is negative other than noted in the HPI or here.        PHYSICAL EXAMINATION  Temp:  [97.7  F (36.5  C)-98.4  F (36.9  C)] 98.4  F (36.9  C)  Pulse:  [52-80] 57  Heart Rate:  [53-70] 57  Resp:  [8-24] 10  BP: ()/(65-94) 98/79  SpO2:  [96 %-100 %] 99 %     Neurologic  Mental Status:  alert, oriented x 3, follows commands, speech clear and fluent, naming and repetition normal  Cranial Nerves:  visual fields intact, PERRL, EOMI with normal smooth pursuit, facial movements symmetric, hearing not formally tested but intact to conversation, no dysarthria, tongue protrusion midline  Motor:  normal muscle tone and bulk, no abnormal movements, able to move all limbs spontaneously, unable to lift arms above shoulder height,  strength equal 4+/5, RUE strength intact proximally but drifts at elbow, mild LUE drift  Reflexes:  not assessed  Sensory:  light touch sensation intact and symmetric throughout upper and lower extremities, no extinction on double simultaneous stimulation   Coordination:  normal finger-to-nose and heel-to-shin bilaterally without  dysmetria  Station/Gait:  deferred      Dysphagia Screen  Per Nursing    Stroke Scales    NIHSS  Interval     Interval Comments     1a. Level of Consciousness 0-->Alert, keenly responsive   1b. LOC Questions 0-->Answers both questions correctly   1c. LOC Commands 0-->Performs both tasks correctly   2.   Best Gaze 0-->Normal   3.   Visual 0-->No visual loss   4.   Facial Palsy 0-->Normal symmetrical movements   5a. Motor Arm, Left 1-->Drift, limb holds 90 (or 45) degrees, but drifts down before full 10 seconds, does not hit bed or other support   5b. Motor Arm, Right 1-->Drift, limb holds 90 (or 45) degrees, but drifts down before full 10 secs, does not hit bed or other support   6a. Motor Leg, Left 0-->No drift, leg holds 30 degree position for full 5 secs   6b. Motor Leg, right 0-->No drift, leg holds 30 degree position for full 5 secs   7.   Limb Ataxia 0-->Absent   8.   Sensory 0-->Normal, no sensory loss   9.   Best Language 0-->No aphasia, normal   10. Dysarthria 0-->Normal   11. Extinction and Inattention  0-->No abnormality   Total 2 (02/06/20 1410)       Imaging  I personally reviewed all imaging; relevant findings per HPI.     Lab Results Data   CBC  Recent Labs   Lab 02/06/20  0900   WBC 11.9*   RBC 4.68   HGB 14.9   HCT 43.9        Basic Metabolic Panel    Recent Labs   Lab 02/06/20  0900      POTASSIUM 3.9   CHLORIDE 103   CO2 29   BUN 13   CR 0.92   *   JULIO 9.4     Liver Panel  No lab results found.  INR  Recent Labs   Lab Test 02/06/20  0900   INR 1.07      Lipid ProfileNo lab results found.  A1CNo lab results found.  Troponin Ketan results for input(s): TROPI in the last 168 hours.       Stroke Code / Stroke Consult Data Data   Stroke Code Data  (for stroke code without tele)  Stroke code activated 02/06/20   0848   First stroke provider response 02/06/20   0900   Last known normal 02/05/20   2200   Time of discovery   (or onset of symptoms) 02/06/20   0600   Head CT read by me  02/06/20   0930   Was stroke code de-escalated? Yes 02/06/20 1001  patient is outside emergent treatment time parameters         I have personally spent a total of 70 minutes providing care supervising this patient's stroke code activation.  I personally reviewed all lab values and radiology images.

## 2020-02-07 ENCOUNTER — TELEPHONE (OUTPATIENT)
Dept: OTHER | Facility: CLINIC | Age: 55
End: 2020-02-07

## 2020-02-07 VITALS
DIASTOLIC BLOOD PRESSURE: 71 MMHG | SYSTOLIC BLOOD PRESSURE: 111 MMHG | OXYGEN SATURATION: 98 % | BODY MASS INDEX: 23.09 KG/M2 | RESPIRATION RATE: 14 BRPM | HEART RATE: 47 BPM | TEMPERATURE: 97.8 F | WEIGHT: 179.9 LBS | HEIGHT: 74 IN

## 2020-02-07 LAB — GLUCOSE BLDC GLUCOMTR-MCNC: 92 MG/DL (ref 70–99)

## 2020-02-07 PROCEDURE — 25000132 ZZH RX MED GY IP 250 OP 250 PS 637: Performed by: INTERNAL MEDICINE

## 2020-02-07 PROCEDURE — 99239 HOSP IP/OBS DSCHRG MGMT >30: CPT | Performed by: INTERNAL MEDICINE

## 2020-02-07 PROCEDURE — 25000128 H RX IP 250 OP 636: Performed by: INTERNAL MEDICINE

## 2020-02-07 PROCEDURE — 99232 SBSQ HOSP IP/OBS MODERATE 35: CPT | Performed by: PSYCHIATRY & NEUROLOGY

## 2020-02-07 PROCEDURE — A9585 GADOBUTROL INJECTION: HCPCS | Performed by: INTERNAL MEDICINE

## 2020-02-07 PROCEDURE — 00000146 ZZHCL STATISTIC GLUCOSE BY METER IP

## 2020-02-07 PROCEDURE — 25800030 ZZH RX IP 258 OP 636: Performed by: INTERNAL MEDICINE

## 2020-02-07 PROCEDURE — 25500064 ZZH RX 255 OP 636: Performed by: INTERNAL MEDICINE

## 2020-02-07 PROCEDURE — 99222 1ST HOSP IP/OBS MODERATE 55: CPT | Performed by: SURGERY

## 2020-02-07 RX ORDER — CARVEDILOL 25 MG/1
25 TABLET ORAL 2 TIMES DAILY WITH MEALS
Status: DISCONTINUED | OUTPATIENT
Start: 2020-02-07 | End: 2020-02-07 | Stop reason: HOSPADM

## 2020-02-07 RX ORDER — CARVEDILOL 12.5 MG/1
12.5 TABLET ORAL 2 TIMES DAILY WITH MEALS
Qty: 60 TABLET | Refills: 3 | Status: SHIPPED | OUTPATIENT
Start: 2020-02-07 | End: 2020-03-08

## 2020-02-07 RX ORDER — ACETAMINOPHEN 325 MG/1
650 TABLET ORAL EVERY 6 HOURS PRN
Qty: 100 TABLET | Refills: 0 | Status: SHIPPED | OUTPATIENT
Start: 2020-02-07

## 2020-02-07 RX ORDER — GADOBUTROL 604.72 MG/ML
7 INJECTION INTRAVENOUS ONCE
Status: COMPLETED | OUTPATIENT
Start: 2020-02-07 | End: 2020-02-07

## 2020-02-07 RX ADMIN — SODIUM CHLORIDE: 9 INJECTION, SOLUTION INTRAVENOUS at 06:41

## 2020-02-07 RX ADMIN — CARVEDILOL 25 MG: 25 TABLET, FILM COATED ORAL at 11:06

## 2020-02-07 RX ADMIN — HYDROMORPHONE HYDROCHLORIDE 0.5 MG: 1 INJECTION, SOLUTION INTRAMUSCULAR; INTRAVENOUS; SUBCUTANEOUS at 00:07

## 2020-02-07 RX ADMIN — GADOBUTROL 7 ML: 604.72 INJECTION INTRAVENOUS at 01:30

## 2020-02-07 ASSESSMENT — ACTIVITIES OF DAILY LIVING (ADL)
ADLS_ACUITY_SCORE: 10
ADLS_ACUITY_SCORE: 11

## 2020-02-07 ASSESSMENT — MIFFLIN-ST. JEOR: SCORE: 1730.75

## 2020-02-07 NOTE — TELEPHONE ENCOUNTER
February 7, 2020    Patient is scheduled for CTA on 3/6/2020 and Follow-Up appointment on 3/10/2020 with Dr. Starkey at [facility]     [facility]  Vascular [facility]    Office: [phone]  Fax: [phone]

## 2020-02-07 NOTE — PROGRESS NOTES
"VASCULAR SURGERY PROGRESS NOTE    Subjective:  Patient found sleeping.  Off labetalol, SBP <120mmhg and has been having bradycardia overnight.  Denies abdominal pain and nausea, no lower extremity weakness or numbness.    Objective:    Intake/Output Summary (Last 24 hours) at 2/7/2020 0754  Last data filed at 2/7/2020 0600  Gross per 24 hour   Intake 1197.5 ml   Output 980 ml   Net 217.5 ml     Labs:  ROUTINE IP LABS (Last four results)  BMP  Recent Labs   Lab 02/06/20  0900      POTASSIUM 3.9   CHLORIDE 103   JULIO 9.4   CO2 29   BUN 13   CR 0.92   *     CBC  Recent Labs   Lab 02/06/20  0900   WBC 11.9*   RBC 4.68   HGB 14.9   HCT 43.9   MCV 94   MCH 31.8   MCHC 33.9   RDW 12.1        INR  Recent Labs   Lab 02/06/20  0900   INR 1.07     PHYSICAL EXAM:  /51   Pulse 75   Temp 97.7  F (36.5  C) (Oral)   Resp 11   Ht 1.88 m (6' 2\")   Wt 81.6 kg (179 lb 14.3 oz)   SpO2 98%   BMI 23.10 kg/m    General: The patient is alert and oriented. Appropriate. No acute distress  Psych: pleasant affect, answers questions appropriately  Skin: Color appropriate for race, warm, dry.  Respiratory: The patient does not require supplemental oxygen. Breathing unlabored  GI:  Abdomen soft, nontender to light palpation.  Extremities: Palpable bilateral DP/PT pulses.        ASSESSMENT:  54 year old male with infrarenal aortic dissection superior to the iliac bifurcation.      PLAN:  -SBP goal <120mmhg and HR goal <80  -Recommend carvedilol 25mg BID for oral BP control, hold if SBP <100, and HR <50.  -Patient to follow up as outpatient with Dr. Starkey in 1 month for repeat CTA.  -Vascular surgery will sign off, please contact us with questions or concerns.    Arianna Whipple, DNP, APRN, AGNP-C  Division of Vascular Surgery   [facility]  Pager: [phone]      Physician Attestation   I, Lizz Starkey, saw and evaluated Nadeem Shine as part of a shared visit.  I have reviewed and " discussed with the advanced practice provider their history, physical and plan.    I personally reviewed the vital signs, medications, labs and imaging.    Mr. Mendez reports feeling overall well today, without chest, back, or abdominal pain. He has been well-controlled in his blood pressure. His exam is notable for a tall, thin body habitus with long extremities. He is resting comfortably in bed with his wife at the bedside. He has palpable DP and radial pulses bilaterally and is non-tender to palpation in the abdomen.     I discussed the pathology of aortic dissection with Mr. Mendez and his wife. I also discussed that this may be a subacute/chronic dissection in him, incidentally found on this CT. I explained the role of good blood pressure management and also reassured him that he can continue his regular activities, as the dissection flap would not be anticipated to propagate with usual exercise or activity (like his running). We will see him in clinic in approximately 1 month with a repeat CTA at that time.     Lizz Starkey  Date of Service (when I saw the patient): 2/7/20

## 2020-02-07 NOTE — PROVIDER NOTIFICATION
"Care Coordination:    It was mentioned this pt is from [place].  No insurance is listed and notes indicate pt was provided with a self-pay packet for the hospitalization.  Typically when pts are here from out of the country there is not insurance coverage outside the hospital setting--clinic visits (primary, outpatient imaging, specialty) are generally out of pocket and require up-front payment.      Sent message to Financial Counselor to reach out to find out if pt or his employer [employer] has any coverage.       Also noted that discharge papers did not provide clinic contact information in the discharge instructions for pt to \"follow up in 2 weeks with outpatient neurology,\" as recommended by the neurology consulting provider.  I paged MD with a heads-up.   I contacted the [facility] office ([phone]) and provided them with a heads-up that this self-pay patient was discharged without instructions to followup in 2 weeks.  They will reach out to pt.     Erlinda Conde RN, BSN, PHN  [facility] Care Coordination  [facility]   Mobile: [phone]    "

## 2020-02-07 NOTE — PROGRESS NOTES
Neuro: assessments improved throughout shift, see flowsheets for details. Currently L>R strength, and pt has numbness between his elbow and shoulder. Q1hr neuro checks. Currently at MRI.   CV: VSS. Labetalol gtt turned off at 1930 d/t low HR. SBP remained in ordered parameters until 2230. MD contacted and PRN hydralazine ordered.  Resp: on RA.   GI/: straight cathed for 980 at 2030. Regular diet ordered.  Skin: no issues.  Gia Martinez RN on 2/6/2020 at 11:44 PM

## 2020-02-07 NOTE — PROGRESS NOTES
"  [facility]    Stroke Progress Note    Interval Events  No acute events overnight. Interval improvement in upper extremity weakness. Mr. Shine reports feeling back to baseline. Spinal MRI without evidence of infarct, but does note findings consistent with ankylosing spondylitis, including C3-4 moderate right neural foraminal narrowing.     Impression  1. Mild RUE weakness, likely secondary to cervical foraminal narrowing. MRI brain and spine negative for infarct.   2. Ankylosing spondylitis  3. Infrarenal aortic dissection    Plan  - Outpatient EMG nerve conduction study to evaluate RUE  - No further stroke evaluation indicated     Follow-Up:  - 2 weeks with outpatient neurology     Thank you for this consult. No further stroke evaluation is indicated, we will sign off. Please contact us with additional questions.     I saw Nadeem Shine  02/07/20 as part of a shared APRN/PA visit. I reviewed all laboratory studies and neuroimaging.  My key exam findings and management decisions carried out under my direction are detailed above.        Bridget Barton, APRN, CNP  Neurology  02/07/2020 10:06 AM  To page stroke neurology after hours or on a subsequent day, click here: AMCOM  Choose \"On Call\" tab at top, then search dropdown box for \"Neurology Adult\" & press Enter, look for Neuro ICU/Stroke  ______________________________________________________    Medications   Home Meds  Prior to Admission medications    Medication Sig Start Date End Date Taking? Authorizing Provider   meloxicam (MOBIC) 7.5 MG tablet Take 7.5 mg by mouth daily   Yes Unknown, Entered By History   UNABLE TO FIND Take by mouth once MEDICATION NAME: paracetamol (similar to acetaminophen, used in other countries)   Yes Unknown, Entered By History   UNKNOWN TO PATIENT Apply topically once Topical analgesic, unknown name   Yes Unknown, Entered By History       Scheduled Meds    carvedilol  25 mg Oral BID w/meals       Infusion Meds    " labetalol 1000 mg/200 mL Stopped (02/06/20 1943)     sodium chloride 75 mL/hr at 02/07/20 0641       PRN Meds  acetaminophen **OR** acetaminophen, hydrALAZINE, HYDROmorphone, magnesium hydroxide, magnesium sulfate, naloxone, ondansetron **OR** ondansetron, potassium chloride, potassium chloride with lidocaine, potassium chloride, potassium chloride, potassium chloride, senna-docusate **OR** senna-docusate       PHYSICAL EXAMINATION  Temp:  [97.6  F (36.4  C)-98.4  F (36.9  C)] 97.6  F (36.4  C)  Pulse:  [46-75] 64  Heart Rate:  [44-70] 56  Resp:  [8-25] 10  BP: ()/() 118/66  SpO2:  [95 %-100 %] 100 %     Neurologic  Mental Status:  alert, oriented x 3, follows commands, speech clear and fluent, naming and repetition normal  Cranial Nerves:  visual fields intact, EOMI with normal smooth pursuit, facial movements symmetric, hearing not formally tested but intact to conversation, no dysarthria, shoulder shrug strong bilaterally  Motor:  normal muscle tone and bulk, no abnormal movements, able to move all limbs spontaneously, BLEs: strength 5/5, LUE: strength 5/5 throughout, RUE: shoulder strength 5/5, elbow flexion 5/5, elbow extension 4/5,  5/5  Reflexes:  not assessed  Sensory:  light touch sensation intact and symmetric throughout upper and lower extremities, no extinction on double simultaneous stimulation   Coordination:  normal finger-to-nose and heel-to-shin bilaterally without dysmetria  Station/Gait:  deferred     Imaging  I personally reviewed all imaging; relevant findings per HPI.     Lab Results Data   CBC  Recent Labs   Lab 02/06/20  0900   WBC 11.9*   RBC 4.68   HGB 14.9   HCT 43.9        Basic Metabolic Panel    Recent Labs   Lab 02/06/20  0900      POTASSIUM 3.9   CHLORIDE 103   CO2 29   BUN 13   CR 0.92   *   JULIO 9.4     Liver Panel  No lab results found.  INR  Recent Labs   Lab Test 02/06/20  0900   INR 1.07      Lipid ProfileNo lab results found.  A1CNo lab  results found.  Troponin Ketan results for input(s): TROPI in the last 168 hours.

## 2020-02-07 NOTE — TELEPHONE ENCOUNTER
Patient needs to be scheduled for 1 month follow up to hospital encounter on 2/6/20 for dissection of abdominal aorta  and CTA abdomen pelvis with Dr. Starkey. CTA is ordered in Epic under Arianna Whiplpe NP.    Routing to scheduling     Leticia HECK, RN    [facility] [place]  Office: [phone]  Fax: [phone]

## 2020-02-07 NOTE — PROGRESS NOTES
ICU Multi-Disciplinary Note  Patient condition reviewed and discussed while on multidisciplinary rounds today.   No new interventions initiated at this time.   The Critical Care service will continue to follow peripherally while the patient is within the ICU. We are readily available should issues arise. Please feel free to contact us for critical care issues with which we may be of assistance. For all other concerns, please contact primary service first.   Beulah Butt NP

## 2020-02-07 NOTE — PLAN OF CARE
Patient discharged to home per Md order. Patient alert and oriented x 4, all vital signs stable. Patient discharge plan and medications reviewed with patient, verbalized back positive learning and understanding. All belongings returned to patient. Patient spouse at bedside to drive him home, escorted to car via wheelchair.

## 2020-02-07 NOTE — PLAN OF CARE
Pt alert and oriented during shift. Bradycardic at times, pt is noted to be an avid runner. Bladder scan this am 175ml. Right arm weakness noted. Equal and strong in other extremities. Remains on room air.

## 2020-02-11 LAB — INTERPRETATION ECG - MUSE: NORMAL

## 2020-02-18 NOTE — PROGRESS NOTES
Rheumatology Clinic Visit      Nadeem Shine MRN# [id number]   YOB: 1965 Age: 54 year old     Date of Visit: 02/19/2020  Primary care provider: No Ref-Primary, Physician          Assessment and Plan:     # Ankylosing Spondylitis (HLA-B27+)   # Infrarenal aortic dissection  # Acute, transient UE weakness    In regards to his ankylosing spondylitis, he has had very little pharmacological intervention, and has had a mild disease course, chiefly characterized by kyphosis. However, as NSAIDs do not alter the disease course, we have a very low threshold to start him on a TNF inhibitor. At this point we will check his inflammatory markers (though CRP can be elevated with aortic dissection) and hold off on starting any medications while his aortic dissection and sudden onset weakness is being evaluated.     In regards to his infrarenal aortic dissection, there are a few case reports of association between type A aortic dissection and ankylosing spondylitis, but it seems very unlikely that there is a connection between the two.     Similarly, there is no rheumatological etiology that could explain his acute, transient weakness. He still has some weakness in his b/l triceps. Notably, there is no foraminal narrowing for C6-8. He also seems to have some weakness in DIP flexion, which can be seen in inclusion body myositis. We will check a CK and aldolase, but it seems unlikely that he has a myositis.     -Check: CRP/ESR, CK/aldolase  -RTC 3 months      Pito Tong MD, PhD  Rheumatology Fellow    Patient staffed with Dr. Del Toro  I saw this patient with the Rheumatology Fellow. I agree with the findings and recommendations.    Anton Del Toro M.D.  Staff Rheumatologist, [facility]  Pager [phone]         Active Problem List:     Patient Active Problem List    Diagnosis Date Noted     Weakness of both upper extremities 02/06/2020     Priority: Medium     Ankylosing spondylitis of lumbosacral region (H)  "history of 02/06/2020     Priority: Medium     Dissection of abdominal aorta (H)-infrarenal area 02/06/2020     Priority: Medium     Aortic dissection (H) 02/06/2020     Priority: Medium            History of Present Illness:     Serology  Positive: HLA-B27 (by hx)  Neg/Nml:    Treatment History  -     Initial HPI 2/19/20  Nadeem Shine is a 54 year old male who presents for establishment of care for long standing ankylosing spondylitis.    He was diagnosed with ankylosing spondylitis when he was in college after he developed recurrent uveitis and was found to be HLA-B27 (his brother was as well). Initially, his eyes were treated prn by ophthalmology. For the first 15-20 years of his disease, he received no systemic treatment. He really has had very little back pain throughout the course of his disease. Though he has had some upper thoracic spine pain.   He was started on daily meloxicam 10-15 years ago, and he has not had an episode of uveitis for ~20 years. He moved to [facility] for work 6 years ago. Since then, he has not been followed by rheumatology, but has continued to take meloxicam. He overall, had been doing very well prior to his recent hospital admission. The only other abnormality he noticed was increased pruritis.     On the morning of 2/6/20, he developed sudden onset weakness in his b/l UE. He had no power above his head and he could not move. This occurred while he was in the shower. He went to the ED, and had MRI brain through lumbar spine that was notable for some spinal cord thinning and mild changes c/w anklysosing spondylitis. Neurology ruled out a stroke. He has a f/u appointment in [place] with neurology that will include an EMG.  He also had a CT angio that found an incidental infrarenal aortic aneurysm. TTE was essentially normal. He is being managed with BP control and will follow up with vascular surgery.     He currently is doing quite well. He still has some \"heaviness\" in his shoulders, " but really no pain. There is no pain/stiffness in pelvic girdle, and he has no GCA symptoms.   He has some shooting pains in his L arm that occur at night. He has some difficulty with fine motor skills (e.g. buttoning his shirt), but otherwise he really has no complaints.          Interval History              Last ophthomology:      Last DEXA:   Osteoporosis Risk Score/FRAX score    [email or web address]  Risk of Hip Fracture:  (Significant if >3%)  Risk of Overall Fracture:  (Significant if >20%)    No results found.  Calcium/VitD:   Bisphosphonate:     HepBcore Ab:   HepBsurfaceAg:   HepC:   HIV:   Tb:   Syphillis:     Vaccinations: (best done 2 weeks before therapy, or hold for 2 weeks, (MTX, abatacept, ritux worst)   Flu:   Td(ap):  (1 dose Tdap, then Td every 10 years)  Shingrix:  (>50 years, 2 doses 2-6 months apart, can be given with pneumo vaccine)  PCV13:  (give first, or >1 year after PPSV23)  PPSV23:   (give at least 8 weeks after PCV13, 2nd dose 5 years after first)  HPV:  (Females 26 and under, males 21 and under; 3 doses: 0, 1-2, 6 months    The ASCVD Risk score (Griselda AGUILAR Jr., et al., 2013) failed to calculate for the following reasons:    Cannot find a previous HDL lab    Cannot find a previous total cholesterol lab (re-calculate q5 years, 1.5 multiplier for RA)           Review of Systems:       A complete, 10-point ROS was negative except as in Interval History          Past Medical History:     Past Medical History:   Diagnosis Date     Arthritis      No past surgical history on file.         Social History:     Social History     Occupational History     Not on file   Tobacco Use     Smoking status: Never Smoker     Smokeless tobacco: Never Used   Substance and Sexual Activity     Alcohol use: Yes     Drug use: Never     Sexual activity: Not on file     No IVDU or snorted drugs         Family History:   No family history on file.  Brother with HLA-B27  Uncle with posible auto-immune  "neurological disease.          Allergies:   No Known Allergies         Medications:     Current Outpatient Medications   Medication Sig Dispense Refill     acetaminophen (TYLENOL) 325 MG tablet Take 2 tablets (650 mg) by mouth every 6 hours as needed for mild pain 100 tablet 0     carvedilol (COREG) 12.5 MG tablet Take 1 tablet (12.5 mg) by mouth 2 times daily (with meals) 60 tablet 3            Physical Exam:   Blood pressure 117/74, pulse 57, temperature 97.3  F (36.3  C), temperature source Oral, height 1.88 m (6' 2\"), weight 81.2 kg (179 lb 1.6 oz), SpO2 99 %.  Body mass index is 23 kg/m .  Wt Readings from Last 4 Encounters:   02/19/20 81.2 kg (179 lb 1.6 oz)   02/07/20 81.6 kg (179 lb 14.3 oz)       Constitutional: well-developed, appearing stated age; cooperative  Skin: no nail pitting, alopecia, rash, nodules or lesions  Eyes: nl EOM, vision, conjunctiva, sclera, tear pool  ENT: nl external ears, nose, hearing, lips, gums, throat  No mucous membrane lesions, normal saliva pool  Neck: no mass, non-tender thyroid  Resp: lungs clear to auscultation, breathing comfortably on room air  CV: RRR, no murmurs, rubs or gallops, no edema  GI: soft, non-tender, non-distended  Lymph: no cervical, supraclavicular, or epitrochlear nodes  Neuro: nl cranial nerves, strength, sensation  Psych: nl judgement, orientation, memory, affect.    MS: The TMJ, neck, shoulder, elbow, wrist, MCP/PIP/DIP, spine, hip, knee, ankle, and MTP/IP joints were examined and found normal. No active synovitis or altered joint anatomy. Full joint ROM. Normal  strength. No dactylitis,  tenosynovitis, enthesopathy. ASHLEY negative  Exceptions as follows: kyphosis, diffuse DIP flexion weakness, b/l arm extension 4/5 R worse than left    2/19/20: Modified Schobers 10-14cm, chest expansion ~3cm, lateral flexion 10cm b/l          Data:     No results found for any visits on 02/19/20.    RHEUM RESULTS Latest Ref Rng & Units 2/6/2020   WBC 4.0 - 11.0 " 10e9/L 11.9(H)   RBC 4.4 - 5.9 10e12/L 4.68   HGB 13.3 - 17.7 g/dL 14.9   HCT 40.0 - 53.0 % 43.9   MCV 78 - 100 fl 94   MCHC 31.5 - 36.5 g/dL 33.9   RDW 10.0 - 15.0 % 12.1    - 450 10e9/L 312   CREATININE 0.66 - 1.25 mg/dL 0.92   GFR ESTIMATE, IF BLACK >60 mL/min/[1.73:m2] >90   GFR ESTIMATE >60 mL/min/[1.73:m2] >90         Pito Najera MD

## 2020-02-19 ENCOUNTER — OFFICE VISIT (OUTPATIENT)
Dept: RHEUMATOLOGY | Facility: CLINIC | Age: 55
End: 2020-02-19
Attending: INTERNAL MEDICINE
Payer: COMMERCIAL

## 2020-02-19 VITALS
BODY MASS INDEX: 22.99 KG/M2 | HEART RATE: 57 BPM | DIASTOLIC BLOOD PRESSURE: 74 MMHG | SYSTOLIC BLOOD PRESSURE: 117 MMHG | OXYGEN SATURATION: 99 % | HEIGHT: 74 IN | TEMPERATURE: 97.3 F | WEIGHT: 179.1 LBS

## 2020-02-19 DIAGNOSIS — R29.898 WEAKNESS OF BOTH UPPER EXTREMITIES: ICD-10-CM

## 2020-02-19 DIAGNOSIS — R29.898 WEAKNESS OF BOTH UPPER EXTREMITIES: Primary | ICD-10-CM

## 2020-02-19 DIAGNOSIS — N39.0 URINARY TRACT INFECTION: ICD-10-CM

## 2020-02-19 LAB
ALBUMIN UR-MCNC: NEGATIVE MG/DL
APPEARANCE UR: CLEAR
BILIRUB UR QL STRIP: NEGATIVE
CK SERPL-CCNC: 141 U/L (ref 30–300)
COLOR UR AUTO: YELLOW
CRP SERPL-MCNC: 29.6 MG/L (ref 0–8)
ERYTHROCYTE [SEDIMENTATION RATE] IN BLOOD BY WESTERGREN METHOD: 30 MM/H (ref 0–20)
GLUCOSE UR STRIP-MCNC: NEGATIVE MG/DL
HGB UR QL STRIP: NEGATIVE
KETONES UR STRIP-MCNC: NEGATIVE MG/DL
LEUKOCYTE ESTERASE UR QL STRIP: ABNORMAL
NITRATE UR QL: NEGATIVE
PH UR STRIP: 8 PH (ref 5–7)
SOURCE: ABNORMAL
SP GR UR STRIP: 1.01 (ref 1–1.03)
UROBILINOGEN UR STRIP-MCNC: 0 MG/DL (ref 0–2)

## 2020-02-19 PROCEDURE — 82550 ASSAY OF CK (CPK): CPT | Performed by: STUDENT IN AN ORGANIZED HEALTH CARE EDUCATION/TRAINING PROGRAM

## 2020-02-19 PROCEDURE — 81003 URINALYSIS AUTO W/O SCOPE: CPT | Performed by: UROLOGY

## 2020-02-19 PROCEDURE — 85652 RBC SED RATE AUTOMATED: CPT | Performed by: STUDENT IN AN ORGANIZED HEALTH CARE EDUCATION/TRAINING PROGRAM

## 2020-02-19 PROCEDURE — G0463 HOSPITAL OUTPT CLINIC VISIT: HCPCS | Mod: ZF

## 2020-02-19 PROCEDURE — 36415 COLL VENOUS BLD VENIPUNCTURE: CPT | Performed by: STUDENT IN AN ORGANIZED HEALTH CARE EDUCATION/TRAINING PROGRAM

## 2020-02-19 PROCEDURE — 86140 C-REACTIVE PROTEIN: CPT | Performed by: STUDENT IN AN ORGANIZED HEALTH CARE EDUCATION/TRAINING PROGRAM

## 2020-02-19 PROCEDURE — 82085 ASSAY OF ALDOLASE: CPT | Performed by: STUDENT IN AN ORGANIZED HEALTH CARE EDUCATION/TRAINING PROGRAM

## 2020-02-19 ASSESSMENT — PAIN SCALES - GENERAL: PAINLEVEL: MODERATE PAIN (5)

## 2020-02-19 ASSESSMENT — MIFFLIN-ST. JEOR: SCORE: 1727.14

## 2020-02-19 NOTE — LETTER
2/19/2020      RE: Nadeem Shine  [address]  [place] [address]         Rheumatology Clinic Visit      Nadeem Shine MRN# [id number]   YOB: 1965 Age: 54 year old     Date of Visit: 02/19/2020  Primary care provider: No Ref-Primary, Physician          Assessment and Plan:     # Ankylosing Spondylitis (HLA-B27+)   # Infrarenal aortic dissection  # Acute, transient UE weakness    In regards to his ankylosing spondylitis, he has had very little pharmacological intervention, and has had a mild disease course, chiefly characterized by kyphosis. However, as NSAIDs do not alter the disease course, we have a very low threshold to start him on a TNF inhibitor. At this point we will check his inflammatory markers (though CRP can be elevated with aortic dissection) and hold off on starting any medications while his aortic dissection and sudden onset weakness is being evaluated.     In regards to his infrarenal aortic dissection, there are a few case reports of association between type A aortic dissection and ankylosing spondylitis, but it seems very unlikely that there is a connection between the two.     Similarly, there is no rheumatological etiology that could explain his acute, transient weakness. He still has some weakness in his b/l triceps. Notably, there is no foraminal narrowing for C6-8. He also seems to have some weakness in DIP flexion, which can be seen in inclusion body myositis. We will check a CK and aldolase, but it seems unlikely that he has a myositis.     -Check: CRP/ESR, CK/aldolase  -RTC 3 months      Pito Tong MD, PhD  Rheumatology Fellow    Patient staffed with Dr. Del Toro  I saw this patient with the Rheumatology Fellow. I agree with the findings and recommendations.    Anton Del Toro M.D.  Staff Rheumatologist, [facility]  Pager [phone]         Active Problem List:     Patient Active Problem List    Diagnosis Date Noted     Weakness of both upper extremities  02/06/2020     Priority: Medium     Ankylosing spondylitis of lumbosacral region (H) history of 02/06/2020     Priority: Medium     Dissection of abdominal aorta (H)-infrarenal area 02/06/2020     Priority: Medium     Aortic dissection (H) 02/06/2020     Priority: Medium            History of Present Illness:     Serology  Positive: HLA-B27 (by hx)  Neg/Nml:    Treatment History  -     Initial HPI 2/19/20  Nadeem Shine is a 54 year old male who presents for establishment of care for long standing ankylosing spondylitis.    He was diagnosed with ankylosing spondylitis when he was in college after he developed recurrent uveitis and was found to be HLA-B27 (his brother was as well). Initially, his eyes were treated prn by ophthalmology. For the first 15-20 years of his disease, he received no systemic treatment. He really has had very little back pain throughout the course of his disease. Though he has had some upper thoracic spine pain.   He was started on daily meloxicam 10-15 years ago, and he has not had an episode of uveitis for ~20 years. He moved to [facility] for work 6 years ago. Since then, he has not been followed by rheumatology, but has continued to take meloxicam. He overall, had been doing very well prior to his recent hospital admission. The only other abnormality he noticed was increased pruritis.     On the morning of 2/6/20, he developed sudden onset weakness in his b/l UE. He had no power above his head and he could not move. This occurred while he was in the shower. He went to the ED, and had MRI brain through lumbar spine that was notable for some spinal cord thinning and mild changes c/w anklysosing spondylitis. Neurology ruled out a stroke. He has a f/u appointment in [place] with neurology that will include an EMG.  He also had a CT angio that found an incidental infrarenal aortic aneurysm. TTE was essentially normal. He is being managed with BP control and will follow up with vascular surgery.  "    He currently is doing quite well. He still has some \"heaviness\" in his shoulders, but really no pain. There is no pain/stiffness in pelvic girdle, and he has no GCA symptoms.   He has some shooting pains in his L arm that occur at night. He has some difficulty with fine motor skills (e.g. buttoning his shirt), but otherwise he really has no complaints.          Interval History              Last ophthomology:      Last DEXA:   Osteoporosis Risk Score/FRAX score    [email or web address]  Risk of Hip Fracture:  (Significant if >3%)  Risk of Overall Fracture:  (Significant if >20%)    No results found.  Calcium/VitD:   Bisphosphonate:     HepBcore Ab:   HepBsurfaceAg:   HepC:   HIV:   Tb:   Syphillis:     Vaccinations: (best done 2 weeks before therapy, or hold for 2 weeks, (MTX, abatacept, ritux worst)   Flu:   Td(ap):  (1 dose Tdap, then Td every 10 years)  Shingrix:  (>50 years, 2 doses 2-6 months apart, can be given with pneumo vaccine)  PCV13:  (give first, or >1 year after PPSV23)  PPSV23:   (give at least 8 weeks after PCV13, 2nd dose 5 years after first)  HPV:  (Females 26 and under, males 21 and under; 3 doses: 0, 1-2, 6 months    The ASCVD Risk score (Griselda AGUILAR Jr., et al., 2013) failed to calculate for the following reasons:    Cannot find a previous HDL lab    Cannot find a previous total cholesterol lab (re-calculate q5 years, 1.5 multiplier for RA)           Review of Systems:       A complete, 10-point ROS was negative except as in Interval History          Past Medical History:     Past Medical History:   Diagnosis Date     Arthritis      No past surgical history on file.         Social History:     Social History     Occupational History     Not on file   Tobacco Use     Smoking status: Never Smoker     Smokeless tobacco: Never Used   Substance and Sexual Activity     Alcohol use: Yes     Drug use: Never     Sexual activity: Not on file     No IVDU or snorted drugs         Family History:   No " "family history on file.  Brother with HLA-B27  Uncle with posible auto-immune neurological disease.          Allergies:   No Known Allergies         Medications:     Current Outpatient Medications   Medication Sig Dispense Refill     acetaminophen (TYLENOL) 325 MG tablet Take 2 tablets (650 mg) by mouth every 6 hours as needed for mild pain 100 tablet 0     carvedilol (COREG) 12.5 MG tablet Take 1 tablet (12.5 mg) by mouth 2 times daily (with meals) 60 tablet 3            Physical Exam:   Blood pressure 117/74, pulse 57, temperature 97.3  F (36.3  C), temperature source Oral, height 1.88 m (6' 2\"), weight 81.2 kg (179 lb 1.6 oz), SpO2 99 %.  Body mass index is 23 kg/m .  Wt Readings from Last 4 Encounters:   02/19/20 81.2 kg (179 lb 1.6 oz)   02/07/20 81.6 kg (179 lb 14.3 oz)       Constitutional: well-developed, appearing stated age; cooperative  Skin: no nail pitting, alopecia, rash, nodules or lesions  Eyes: nl EOM, vision, conjunctiva, sclera, tear pool  ENT: nl external ears, nose, hearing, lips, gums, throat  No mucous membrane lesions, normal saliva pool  Neck: no mass, non-tender thyroid  Resp: lungs clear to auscultation, breathing comfortably on room air  CV: RRR, no murmurs, rubs or gallops, no edema  GI: soft, non-tender, non-distended  Lymph: no cervical, supraclavicular, or epitrochlear nodes  Neuro: nl cranial nerves, strength, sensation  Psych: nl judgement, orientation, memory, affect.    MS: The TMJ, neck, shoulder, elbow, wrist, MCP/PIP/DIP, spine, hip, knee, ankle, and MTP/IP joints were examined and found normal. No active synovitis or altered joint anatomy. Full joint ROM. Normal  strength. No dactylitis,  tenosynovitis, enthesopathy. ASHLEY negative  Exceptions as follows: kyphosis, diffuse DIP flexion weakness, b/l arm extension 4/5 R worse than left    2/19/20: Modified Schobers 10-14cm, chest expansion ~3cm, lateral flexion 10cm b/l          Data:     No results found for any visits on " 02/19/20.    RHEUM RESULTS Latest Ref Rng & Units 2/6/2020   WBC 4.0 - 11.0 10e9/L 11.9(H)   RBC 4.4 - 5.9 10e12/L 4.68   HGB 13.3 - 17.7 g/dL 14.9   HCT 40.0 - 53.0 % 43.9   MCV 78 - 100 fl 94   MCHC 31.5 - 36.5 g/dL 33.9   RDW 10.0 - 15.0 % 12.1    - 450 10e9/L 312   CREATININE 0.66 - 1.25 mg/dL 0.92   GFR ESTIMATE, IF BLACK >60 mL/min/[1.73:m2] >90   GFR ESTIMATE >60 mL/min/[1.73:m2] >90         Pito Najera MD

## 2020-02-19 NOTE — NURSING NOTE
"Chief Complaint   Patient presents with     Consult     AS     /74   Pulse 57   Temp 97.3  F (36.3  C) (Oral)   Ht 1.88 m (6' 2\")   Wt 81.2 kg (179 lb 1.6 oz)   SpO2 99%   BMI 23.00 kg/m    Jenna Mendez CMA  2/19/2020 10:29 AM    "

## 2020-02-19 NOTE — LETTER
2/19/2020       RE: Nadeem Shine  [address]  [facility] [address]     Dear Colleague,    Thank you for referring your patient, Nadeem Shine, to the [facility] RHEUMATOLOGY at [facility]. Please see a copy of my visit note below.      Rheumatology Clinic Visit      Nadeem Shine MRN# [id number]   YOB: 1965 Age: 54 year old     Date of Visit: 02/19/2020  Primary care provider: No Ref-Primary, Physician          Assessment and Plan:     # Ankylosing Spondylitis (HLA-B27+)   # Infrarenal aortic dissection  # Acute, transient UE weakness    In regards to his ankylosing spondylitis, he has had very little pharmacological intervention, and has had a mild disease course, chiefly characterized by kyphosis. However, as NSAIDs do not alter the disease course, we have a very low threshold to start him on a TNF inhibitor. At this point we will check his inflammatory markers (though CRP can be elevated with aortic dissection) and hold off on starting any medications while his aortic dissection and sudden onset weakness is being evaluated.     In regards to his infrarenal aortic dissection, there are a few case reports of association between type A aortic dissection and ankylosing spondylitis, but it seems very unlikely that there is a connection between the two.     Similarly, there is no rheumatological etiology that could explain his acute, transient weakness. He still has some weakness in his b/l triceps. Notably, there is no foraminal narrowing for C6-8. He also seems to have some weakness in DIP flexion, which can be seen in inclusion body myositis. We will check a CK and aldolase, but it seems unlikely that he has a myositis.     -Check: CRP/ESR, CK/aldolase  -RTC 3 months      Pito Tong MD, PhD  Rheumatology Fellow    Patient staffed with Dr. Del Toro  I saw this patient with the Rheumatology Fellow. I agree with the findings and recommendations.    Anton  NUVIA Del Toro.  Staff Rheumatologist, [facility]  Pager [phone]         Active Problem List:     Patient Active Problem List    Diagnosis Date Noted     Weakness of both upper extremities 02/06/2020     Priority: Medium     Ankylosing spondylitis of lumbosacral region (H) history of 02/06/2020     Priority: Medium     Dissection of abdominal aorta (H)-infrarenal area 02/06/2020     Priority: Medium     Aortic dissection (H) 02/06/2020     Priority: Medium            History of Present Illness:     Serology  Positive: HLA-B27 (by hx)  Neg/Nml:    Treatment History  -     Initial HPI 2/19/20  Nadeem Shine is a 54 year old male who presents for establishment of care for long standing ankylosing spondylitis.    He was diagnosed with ankylosing spondylitis when he was in college after he developed recurrent uveitis and was found to be HLA-B27 (his brother was as well). Initially, his eyes were treated prn by ophthalmology. For the first 15-20 years of his disease, he received no systemic treatment. He really has had very little back pain throughout the course of his disease. Though he has had some upper thoracic spine pain.   He was started on daily meloxicam 10-15 years ago, and he has not had an episode of uveitis for ~20 years. He moved to [facility] for work 6 years ago. Since then, he has not been followed by rheumatology, but has continued to take meloxicam. He overall, had been doing very well prior to his recent hospital admission. The only other abnormality he noticed was increased pruritis.     On the morning of 2/6/20, he developed sudden onset weakness in his b/l UE. He had no power above his head and he could not move. This occurred while he was in the shower. He went to the ED, and had MRI brain through lumbar spine that was notable for some spinal cord thinning and mild changes c/w anklysosing spondylitis. Neurology ruled out a stroke. He has a f/u appointment in [place] with neurology that will include  "an EMG.  He also had a CT angio that found an incidental infrarenal aortic aneurysm. TTE was essentially normal. He is being managed with BP control and will follow up with vascular surgery.     He currently is doing quite well. He still has some \"heaviness\" in his shoulders, but really no pain. There is no pain/stiffness in pelvic girdle, and he has no GCA symptoms.   He has some shooting pains in his L arm that occur at night. He has some difficulty with fine motor skills (e.g. buttoning his shirt), but otherwise he really has no complaints.          Interval History              Last ophthomology:      Last DEXA:   Osteoporosis Risk Score/FRAX score    [email or web address]  Risk of Hip Fracture:  (Significant if >3%)  Risk of Overall Fracture:  (Significant if >20%)    No results found.  Calcium/VitD:   Bisphosphonate:     HepBcore Ab:   HepBsurfaceAg:   HepC:   HIV:   Tb:   Syphillis:     Vaccinations: (best done 2 weeks before therapy, or hold for 2 weeks, (MTX, abatacept, ritux worst)   Flu:   Td(ap):  (1 dose Tdap, then Td every 10 years)  Shingrix:  (>50 years, 2 doses 2-6 months apart, can be given with pneumo vaccine)  PCV13:  (give first, or >1 year after PPSV23)  PPSV23:   (give at least 8 weeks after PCV13, 2nd dose 5 years after first)  HPV:  (Females 26 and under, males 21 and under; 3 doses: 0, 1-2, 6 months    The ASCVD Risk score (Griselda JEFF Jr., et al., 2013) failed to calculate for the following reasons:    Cannot find a previous HDL lab    Cannot find a previous total cholesterol lab (re-calculate q5 years, 1.5 multiplier for RA)           Review of Systems:       A complete, 10-point ROS was negative except as in Interval History          Past Medical History:     Past Medical History:   Diagnosis Date     Arthritis      No past surgical history on file.         Social History:     Social History     Occupational History     Not on file   Tobacco Use     Smoking status: Never Smoker     " "Smokeless tobacco: Never Used   Substance and Sexual Activity     Alcohol use: Yes     Drug use: Never     Sexual activity: Not on file     No IVDU or snorted drugs         Family History:   No family history on file.  Brother with HLA-B27  Uncle with posible auto-immune neurological disease.          Allergies:   No Known Allergies         Medications:     Current Outpatient Medications   Medication Sig Dispense Refill     acetaminophen (TYLENOL) 325 MG tablet Take 2 tablets (650 mg) by mouth every 6 hours as needed for mild pain 100 tablet 0     carvedilol (COREG) 12.5 MG tablet Take 1 tablet (12.5 mg) by mouth 2 times daily (with meals) 60 tablet 3            Physical Exam:   Blood pressure 117/74, pulse 57, temperature 97.3  F (36.3  C), temperature source Oral, height 1.88 m (6' 2\"), weight 81.2 kg (179 lb 1.6 oz), SpO2 99 %.  Body mass index is 23 kg/m .  Wt Readings from Last 4 Encounters:   02/19/20 81.2 kg (179 lb 1.6 oz)   02/07/20 81.6 kg (179 lb 14.3 oz)       Constitutional: well-developed, appearing stated age; cooperative  Skin: no nail pitting, alopecia, rash, nodules or lesions  Eyes: nl EOM, vision, conjunctiva, sclera, tear pool  ENT: nl external ears, nose, hearing, lips, gums, throat  No mucous membrane lesions, normal saliva pool  Neck: no mass, non-tender thyroid  Resp: lungs clear to auscultation, breathing comfortably on room air  CV: RRR, no murmurs, rubs or gallops, no edema  GI: soft, non-tender, non-distended  Lymph: no cervical, supraclavicular, or epitrochlear nodes  Neuro: nl cranial nerves, strength, sensation  Psych: nl judgement, orientation, memory, affect.    MS: The TMJ, neck, shoulder, elbow, wrist, MCP/PIP/DIP, spine, hip, knee, ankle, and MTP/IP joints were examined and found normal. No active synovitis or altered joint anatomy. Full joint ROM. Normal  strength. No dactylitis,  tenosynovitis, enthesopathy. ASHLEY negative  Exceptions as follows: kyphosis, diffuse DIP " flexion weakness, b/l arm extension 4/5 R worse than left    2/19/20: Modified Schobers 10-14cm, chest expansion ~3cm, lateral flexion 10cm b/l          Data:     No results found for any visits on 02/19/20.    RHEUM RESULTS Latest Ref Rng & Units 2/6/2020   WBC 4.0 - 11.0 10e9/L 11.9(H)   RBC 4.4 - 5.9 10e12/L 4.68   HGB 13.3 - 17.7 g/dL 14.9   HCT 40.0 - 53.0 % 43.9   MCV 78 - 100 fl 94   MCHC 31.5 - 36.5 g/dL 33.9   RDW 10.0 - 15.0 % 12.1    - 450 10e9/L 312   CREATININE 0.66 - 1.25 mg/dL 0.92   GFR ESTIMATE, IF BLACK >60 mL/min/[1.73:m2] >90   GFR ESTIMATE >60 mL/min/[1.73:m2] >90         Pito Najera MD

## 2020-02-19 NOTE — PATIENT INSTRUCTIONS
-check blood tests today  -Will send results by [email or web address]  -Please make sure your EMG results are put in [employer] system.  -Return to clinic in 3 months.   -Continue to hold meloxicam until cardiology approved

## 2020-02-20 LAB — ALDOLASE SERPL-CCNC: 3.2 U/L (ref 1.5–8.1)

## 2020-02-26 ENCOUNTER — TRANSFERRED RECORDS (OUTPATIENT)
Dept: HEALTH INFORMATION MANAGEMENT | Facility: CLINIC | Age: 55
End: 2020-02-26

## 2020-03-06 ENCOUNTER — HOSPITAL ENCOUNTER (OUTPATIENT)
Dept: CT IMAGING | Facility: CLINIC | Age: 55
Discharge: HOME | End: 2020-03-06
Attending: NURSE PRACTITIONER | Admitting: NURSE PRACTITIONER
Payer: COMMERCIAL

## 2020-03-06 DIAGNOSIS — I71.02 DISSECTION OF ABDOMINAL AORTA (H): ICD-10-CM

## 2020-03-06 PROCEDURE — 25000128 H RX IP 250 OP 636: Performed by: NURSE PRACTITIONER

## 2020-03-06 PROCEDURE — 74174 CTA ABD&PLVS W/CONTRAST: CPT

## 2020-03-06 PROCEDURE — 25000125 ZZHC RX 250: Performed by: NURSE PRACTITIONER

## 2020-03-06 RX ORDER — IOPAMIDOL 755 MG/ML
80 INJECTION, SOLUTION INTRAVASCULAR ONCE
Status: COMPLETED | OUTPATIENT
Start: 2020-03-06 | End: 2020-03-06

## 2020-03-06 RX ADMIN — SODIUM CHLORIDE 80 ML: 9 INJECTION, SOLUTION INTRAVENOUS at 08:59

## 2020-03-06 RX ADMIN — IOPAMIDOL 80 ML: 755 INJECTION, SOLUTION INTRAVENOUS at 08:59

## 2020-03-09 NOTE — PROGRESS NOTES
"Vascular Surgery Progress Note     Date: March 9, 2020     Reason for Visit:  Follow-up of infrarenal aortic dissection.     Interim History:   Nadeem West is a 54M who initially presented on 2/6/20 with bilateral arm weakness and upper back pain. He was found to be hypertensive in the ED and underwent CT imaging, which revealed an isolated infrarenal abdominal aortic dissection flap. He was admitted and had control of his blood pressure; he was evaluated by neurology and will undergo ongoing evaluation for upper extremity weakness.    Subjective:  Mr. West reports feeling well. He has not had any new concerns or complaints since his discharge from the hospital, and has not had new abdominal or back pain. He had previously been on Meloxicam and wonders if he may resume taking this. He has been well-controlled in his blood pressure while on coreg. His arm strength seems to have improved.       Current Outpatient Medications:      acetaminophen (TYLENOL) 325 MG tablet, Take 2 tablets (650 mg) by mouth every 6 hours as needed for mild pain, Disp: 100 tablet, Rfl: 0     carvedilol (COREG) 12.5 MG tablet, Take 1 tablet (12.5 mg) by mouth 2 times daily (with meals), Disp: 60 tablet, Rfl: 3     Physical Exam       BP: 115/76 Pulse: 67            Vital Signs with Ranges  Pulse:  [67] 67  BP: (115)/(76) 115/76  0 lbs 0 oz    Constitutional: cooperative, no apparent distress, sitting comfortably in chair.   Musculoskeletal: grossly normal and symmetric ROM and strength in BL extremities   Neurologic: Awake, alert, oriented to name, place, time, and situation    Imaging:  I have reviewed the following imaging studies:  CTA abd/pelvis (3/6/20): \"IMPRESSION: Stable appearance and extent of the infrarenal aortic dissection flap extending to the left common iliac artery origin. There is robust filling of the true and false lumens. No flap propagation. No aneurysmal dilation for intramural hematoma.\"       Assessment & Plan "   Nadeem Mendez is a 54 year old male with history of an isolated infrarenal abdominal aortic dissection flap, found incidentally on CT imaging in 02/20.      I discussed with him that we typically obtain follow-up CT imaging at 3 months, 6 months, and then annually to follow dissections. However, as the chronicity of this dissection is unknown (and seems unlikely to have been acute, as it was totally asymptomatic and found semi-incidentally on CT imaging), I will have him follow up in 1 year for repeat imaging.     I discussed the rationale and potential risks of ongoing surveillance imaging, needed on an annual basis. I explained the possibility of dissection extension or future aneurysmal degeneration.     His hypertension is well-controlled currently. Would continue to maintain blood pressure with goal of SBP < 120 mmHg.     He will obtain copies of his CT imaging to take with him on his travels abroad    He understands to present to medical care if he develops chest, back, or abdominal pain or has any questions or concerns about the dissection    I encouraged him to continue his usual exercises and activities; he enjoys running and I feel this is safe for him to do    Will see him in 12 months or sooner if needed, with repeat CTA     Lizz Starkey

## 2020-03-10 ENCOUNTER — OFFICE VISIT (OUTPATIENT)
Dept: OTHER | Facility: CLINIC | Age: 55
End: 2020-03-10
Attending: NURSE PRACTITIONER
Payer: COMMERCIAL

## 2020-03-10 VITALS — SYSTOLIC BLOOD PRESSURE: 115 MMHG | DIASTOLIC BLOOD PRESSURE: 76 MMHG | HEART RATE: 67 BPM

## 2020-03-10 DIAGNOSIS — I71.02 DISSECTION OF ABDOMINAL AORTA (H): Primary | ICD-10-CM

## 2020-03-10 PROCEDURE — 99214 OFFICE O/P EST MOD 30 MIN: CPT | Mod: ZP | Performed by: SURGERY

## 2020-03-10 PROCEDURE — G0463 HOSPITAL OUTPT CLINIC VISIT: HCPCS

## 2020-03-10 NOTE — NURSING NOTE
"Nadeem Shine is a 54 year old male who presents for:  Chief Complaint   Patient presents with     RECHECK     1 month follow up to hospital encounter on 2/6/20 for dissection of abdominal aorta. CTA ABD/PEL in Epic 3/6/2020        Vitals:    Vitals:    03/10/20 0948   BP: 115/76   BP Location: Left arm   Patient Position: Chair   Cuff Size: Adult Regular   Pulse: 67       BMI:  Estimated body mass index is 23 kg/m  as calculated from the following:    Height as of 2/19/20: 6' 2\" (1.88 m).    Weight as of 2/19/20: 179 lb 1.6 oz (81.2 kg).    Pain Score:  Data Unavailable        Shantell Salas MA    "

## 2020-03-11 ENCOUNTER — HEALTH MAINTENANCE LETTER (OUTPATIENT)
Age: 55
End: 2020-03-11

## 2021-01-04 ENCOUNTER — HEALTH MAINTENANCE LETTER (OUTPATIENT)
Age: 56
End: 2021-01-04

## 2021-03-16 ENCOUNTER — TELEPHONE (OUTPATIENT)
Dept: OTHER | Facility: CLINIC | Age: 56
End: 2021-03-16

## 2021-03-16 NOTE — TELEPHONE ENCOUNTER
2+ attempts have been made to schedule follow up with Dr. Starkey. No further attempts to be made.     Attempts:   2/8/2021 NIS; 1st LS  3/2/2021 2nd LS    Rossana JUAREZ

## 2021-04-25 ENCOUNTER — HEALTH MAINTENANCE LETTER (OUTPATIENT)
Age: 56
End: 2021-04-25

## 2021-10-10 ENCOUNTER — HEALTH MAINTENANCE LETTER (OUTPATIENT)
Age: 56
End: 2021-10-10

## 2022-05-22 ENCOUNTER — HEALTH MAINTENANCE LETTER (OUTPATIENT)
Age: 57
End: 2022-05-22

## 2022-09-18 ENCOUNTER — HEALTH MAINTENANCE LETTER (OUTPATIENT)
Age: 57
End: 2022-09-18

## 2023-06-04 ENCOUNTER — HEALTH MAINTENANCE LETTER (OUTPATIENT)
Age: 58
End: 2023-06-04